# Patient Record
Sex: FEMALE | Race: WHITE | Employment: FULL TIME | URBAN - METROPOLITAN AREA
[De-identification: names, ages, dates, MRNs, and addresses within clinical notes are randomized per-mention and may not be internally consistent; named-entity substitution may affect disease eponyms.]

---

## 2018-04-20 ENCOUNTER — TELEPHONE (OUTPATIENT)
Dept: FAMILY MEDICINE CLINIC | Age: 43
End: 2018-04-20

## 2018-10-19 ENCOUNTER — OFFICE VISIT (OUTPATIENT)
Dept: FAMILY MEDICINE CLINIC | Age: 43
End: 2018-10-19
Payer: COMMERCIAL

## 2018-10-19 VITALS
RESPIRATION RATE: 12 BRPM | SYSTOLIC BLOOD PRESSURE: 122 MMHG | BODY MASS INDEX: 24.13 KG/M2 | HEART RATE: 64 BPM | HEIGHT: 63 IN | WEIGHT: 136.2 LBS | TEMPERATURE: 98.7 F | DIASTOLIC BLOOD PRESSURE: 80 MMHG

## 2018-10-19 DIAGNOSIS — Z00.00 ROUTINE GENERAL MEDICAL EXAMINATION AT A HEALTH CARE FACILITY: ICD-10-CM

## 2018-10-19 DIAGNOSIS — Z00.00 ROUTINE GENERAL MEDICAL EXAMINATION AT A HEALTH CARE FACILITY: Primary | ICD-10-CM

## 2018-10-19 LAB
ALBUMIN SERPL-MCNC: 4.7 G/DL (ref 3.9–4.9)
ALP BLD-CCNC: 45 U/L (ref 40–130)
ALT SERPL-CCNC: 14 U/L (ref 0–33)
ANION GAP SERPL CALCULATED.3IONS-SCNC: 14 MEQ/L (ref 7–13)
AST SERPL-CCNC: 20 U/L (ref 0–35)
BASOPHILS ABSOLUTE: 0 K/UL (ref 0–0.2)
BASOPHILS RELATIVE PERCENT: 0.8 %
BILIRUB SERPL-MCNC: 0.4 MG/DL (ref 0–1.2)
BUN BLDV-MCNC: 12 MG/DL (ref 6–20)
CALCIUM SERPL-MCNC: 9.9 MG/DL (ref 8.6–10.2)
CHLORIDE BLD-SCNC: 101 MEQ/L (ref 98–107)
CHOLESTEROL, TOTAL: 212 MG/DL (ref 0–199)
CO2: 26 MEQ/L (ref 22–29)
CREAT SERPL-MCNC: 0.78 MG/DL (ref 0.5–0.9)
EOSINOPHILS ABSOLUTE: 0.1 K/UL (ref 0–0.7)
EOSINOPHILS RELATIVE PERCENT: 2.9 %
GFR AFRICAN AMERICAN: >60
GFR NON-AFRICAN AMERICAN: >60
GLOBULIN: 2.7 G/DL (ref 2.3–3.5)
GLUCOSE BLD-MCNC: 76 MG/DL (ref 74–109)
HCT VFR BLD CALC: 43.3 % (ref 37–47)
HDLC SERPL-MCNC: 91 MG/DL (ref 40–59)
HEMOGLOBIN: 14.4 G/DL (ref 12–16)
LDL CHOLESTEROL CALCULATED: 109 MG/DL (ref 0–129)
LYMPHOCYTES ABSOLUTE: 1.4 K/UL (ref 1–4.8)
LYMPHOCYTES RELATIVE PERCENT: 33.3 %
MCH RBC QN AUTO: 33 PG (ref 27–31.3)
MCHC RBC AUTO-ENTMCNC: 33.3 % (ref 33–37)
MCV RBC AUTO: 99 FL (ref 82–100)
MONOCYTES ABSOLUTE: 0.4 K/UL (ref 0.2–0.8)
MONOCYTES RELATIVE PERCENT: 9.1 %
NEUTROPHILS ABSOLUTE: 2.3 K/UL (ref 1.4–6.5)
NEUTROPHILS RELATIVE PERCENT: 53.9 %
PDW BLD-RTO: 13.5 % (ref 11.5–14.5)
PLATELET # BLD: 211 K/UL (ref 130–400)
POTASSIUM SERPL-SCNC: 4.2 MEQ/L (ref 3.5–5.1)
RBC # BLD: 4.37 M/UL (ref 4.2–5.4)
SODIUM BLD-SCNC: 141 MEQ/L (ref 132–144)
TOTAL PROTEIN: 7.4 G/DL (ref 6.4–8.1)
TRIGL SERPL-MCNC: 59 MG/DL (ref 0–200)
WBC # BLD: 4.3 K/UL (ref 4.8–10.8)

## 2018-10-19 PROCEDURE — 99396 PREV VISIT EST AGE 40-64: CPT | Performed by: FAMILY MEDICINE

## 2018-10-19 PROCEDURE — G8484 FLU IMMUNIZE NO ADMIN: HCPCS | Performed by: FAMILY MEDICINE

## 2018-10-19 ASSESSMENT — PATIENT HEALTH QUESTIONNAIRE - PHQ9
1. LITTLE INTEREST OR PLEASURE IN DOING THINGS: 0
SUM OF ALL RESPONSES TO PHQ9 QUESTIONS 1 & 2: 0
SUM OF ALL RESPONSES TO PHQ QUESTIONS 1-9: 0
2. FEELING DOWN, DEPRESSED OR HOPELESS: 0
SUM OF ALL RESPONSES TO PHQ QUESTIONS 1-9: 0

## 2018-10-19 ASSESSMENT — ENCOUNTER SYMPTOMS
EYES NEGATIVE: 1
ABDOMINAL PAIN: 0
CONSTIPATION: 0
COUGH: 0
NAUSEA: 0
DIARRHEA: 0
SHORTNESS OF BREATH: 0

## 2019-06-12 ENCOUNTER — TELEPHONE (OUTPATIENT)
Dept: ADMINISTRATIVE | Age: 44
End: 2019-06-12

## 2020-03-27 RX ORDER — ATORVASTATIN CALCIUM 80 MG/1
40 TABLET, FILM COATED ORAL NIGHTLY
COMMUNITY
Start: 2020-03-24 | End: 2020-04-23

## 2020-03-27 RX ORDER — MULTIVITAMIN WITH IRON
250 TABLET ORAL DAILY
COMMUNITY

## 2020-03-31 ENCOUNTER — VIRTUAL VISIT (OUTPATIENT)
Dept: CARDIOLOGY CLINIC | Age: 45
End: 2020-03-31
Payer: COMMERCIAL

## 2020-03-31 PROBLEM — E78.5 DYSLIPIDEMIA: Status: ACTIVE | Noted: 2020-03-31

## 2020-03-31 PROBLEM — Z87.891 HISTORY OF TOBACCO ABUSE: Status: ACTIVE | Noted: 2020-03-31

## 2020-03-31 PROCEDURE — G8421 BMI NOT CALCULATED: HCPCS | Performed by: INTERNAL MEDICINE

## 2020-03-31 PROCEDURE — 99203 OFFICE O/P NEW LOW 30 MIN: CPT | Performed by: INTERNAL MEDICINE

## 2020-03-31 PROCEDURE — G8427 DOCREV CUR MEDS BY ELIG CLIN: HCPCS | Performed by: INTERNAL MEDICINE

## 2020-03-31 PROCEDURE — 1036F TOBACCO NON-USER: CPT | Performed by: INTERNAL MEDICINE

## 2020-03-31 PROCEDURE — G8484 FLU IMMUNIZE NO ADMIN: HCPCS | Performed by: INTERNAL MEDICINE

## 2020-03-31 ASSESSMENT — ENCOUNTER SYMPTOMS
SHORTNESS OF BREATH: 0
VOMITING: 0
WHEEZING: 0
ABDOMINAL PAIN: 0
EYES NEGATIVE: 1
NAUSEA: 0
GASTROINTESTINAL NEGATIVE: 1

## 2020-04-21 ENCOUNTER — VIRTUAL VISIT (OUTPATIENT)
Dept: CARDIOLOGY CLINIC | Age: 45
End: 2020-04-21
Payer: COMMERCIAL

## 2020-04-21 PROCEDURE — G8427 DOCREV CUR MEDS BY ELIG CLIN: HCPCS | Performed by: INTERNAL MEDICINE

## 2020-04-21 PROCEDURE — 99213 OFFICE O/P EST LOW 20 MIN: CPT | Performed by: INTERNAL MEDICINE

## 2020-04-21 PROCEDURE — G8421 BMI NOT CALCULATED: HCPCS | Performed by: INTERNAL MEDICINE

## 2020-04-21 PROCEDURE — 1036F TOBACCO NON-USER: CPT | Performed by: INTERNAL MEDICINE

## 2020-04-21 ASSESSMENT — ENCOUNTER SYMPTOMS
GASTROINTESTINAL NEGATIVE: 1
ABDOMINAL PAIN: 0
WHEEZING: 0
NAUSEA: 0
SHORTNESS OF BREATH: 0
VOMITING: 0
EYES NEGATIVE: 1

## 2020-05-13 ENCOUNTER — HOSPITAL ENCOUNTER (OUTPATIENT)
Dept: CARDIAC CATH/INVASIVE PROCEDURES | Age: 45
Discharge: HOME | End: 2020-05-13
Attending: INTERNAL MEDICINE | Admitting: INTERNAL MEDICINE
Payer: COMMERCIAL

## 2020-05-13 VITALS
OXYGEN SATURATION: 100 % | HEART RATE: 71 BPM | RESPIRATION RATE: 16 BRPM | DIASTOLIC BLOOD PRESSURE: 70 MMHG | WEIGHT: 128 LBS | BODY MASS INDEX: 23.55 KG/M2 | TEMPERATURE: 98.2 F | SYSTOLIC BLOOD PRESSURE: 105 MMHG | HEIGHT: 62 IN

## 2020-05-13 PROCEDURE — 6360000002 HC RX W HCPCS

## 2020-05-13 PROCEDURE — 93325 DOPPLER ECHO COLOR FLOW MAPG: CPT

## 2020-05-13 PROCEDURE — 93321 DOPPLER ECHO F-UP/LMTD STD: CPT

## 2020-05-13 PROCEDURE — 93312 ECHO TRANSESOPHAGEAL: CPT

## 2020-05-13 RX ORDER — ATORVASTATIN CALCIUM 80 MG/1
40 TABLET, FILM COATED ORAL DAILY
COMMUNITY

## 2020-05-13 RX ORDER — SODIUM CHLORIDE 0.9 % (FLUSH) 0.9 %
10 SYRINGE (ML) INJECTION EVERY 12 HOURS SCHEDULED
Status: DISCONTINUED | OUTPATIENT
Start: 2020-05-13 | End: 2020-05-13 | Stop reason: HOSPADM

## 2020-05-13 RX ORDER — SODIUM CHLORIDE 9 MG/ML
INJECTION, SOLUTION INTRAVENOUS CONTINUOUS
Status: DISCONTINUED | OUTPATIENT
Start: 2020-05-13 | End: 2020-05-13 | Stop reason: HOSPADM

## 2020-05-13 RX ORDER — BACTERIOSTATIC SODIUM CHLORIDE 0.9 %
30 VIAL (ML) INJECTION ONCE
Status: DISCONTINUED | OUTPATIENT
Start: 2020-05-13 | End: 2020-05-13 | Stop reason: HOSPADM

## 2020-05-13 RX ORDER — SODIUM CHLORIDE 0.9 % (FLUSH) 0.9 %
10 SYRINGE (ML) INJECTION PRN
Status: DISCONTINUED | OUTPATIENT
Start: 2020-05-13 | End: 2020-05-13 | Stop reason: HOSPADM

## 2020-05-13 RX ORDER — LIDOCAINE HYDROCHLORIDE 20 MG/ML
15 SOLUTION OROPHARYNGEAL ONCE
Status: DISCONTINUED | OUTPATIENT
Start: 2020-05-13 | End: 2020-05-13 | Stop reason: HOSPADM

## 2020-05-13 NOTE — BRIEF OP NOTE
Section of Cardiology  Adult Brief VINCENT Procedure Note        Procedure(s):  VINCENT    Pre-operative Diagnosis:  TIA    H&P Status: Completed and reviewed.      Post-operative Diagnosis:      Normal VINCENT    Findings:  See full report    Complications:  none    Primary Proceduralist:   Dr.Wes Cary DO      Full procedure note to follow

## 2020-05-18 PROCEDURE — 93325 DOPPLER ECHO COLOR FLOW MAPG: CPT | Performed by: INTERNAL MEDICINE

## 2020-05-18 PROCEDURE — 93312 ECHO TRANSESOPHAGEAL: CPT | Performed by: INTERNAL MEDICINE

## 2020-10-07 RX ORDER — ASPIRIN 81 MG/1
81 TABLET ORAL DAILY
COMMUNITY
Start: 2020-03-27

## 2020-10-08 ENCOUNTER — VIRTUAL VISIT (OUTPATIENT)
Dept: CARDIOLOGY CLINIC | Age: 45
End: 2020-10-08
Payer: COMMERCIAL

## 2020-10-08 PROBLEM — Z86.73 HISTORY OF TIA (TRANSIENT ISCHEMIC ATTACK): Status: ACTIVE | Noted: 2020-10-08

## 2020-10-08 PROCEDURE — 1036F TOBACCO NON-USER: CPT | Performed by: INTERNAL MEDICINE

## 2020-10-08 PROCEDURE — 99213 OFFICE O/P EST LOW 20 MIN: CPT | Performed by: INTERNAL MEDICINE

## 2020-10-08 PROCEDURE — G8484 FLU IMMUNIZE NO ADMIN: HCPCS | Performed by: INTERNAL MEDICINE

## 2020-10-08 PROCEDURE — G8420 CALC BMI NORM PARAMETERS: HCPCS | Performed by: INTERNAL MEDICINE

## 2020-10-08 PROCEDURE — G8427 DOCREV CUR MEDS BY ELIG CLIN: HCPCS | Performed by: INTERNAL MEDICINE

## 2020-10-08 ASSESSMENT — ENCOUNTER SYMPTOMS
EYES NEGATIVE: 1
GASTROINTESTINAL NEGATIVE: 1
NAUSEA: 0
WHEEZING: 0
ABDOMINAL PAIN: 0
SHORTNESS OF BREATH: 0
VOMITING: 0

## 2020-10-08 NOTE — PROGRESS NOTES
10/8/2020    TELEHEALTH EVALUATION -- Audio/Visual (During YOVPB-18 public health emergency)    Due to COVID 19 outbreak, patient's office visit was converted to a virtual visit. Patient was contacted and agreed to proceed with a virtual visit via [email or web address] me  The risks and benefits of converting to a virtual visit were discussed in light of the current infectious disease epidemic. Patient also understood that insurance coverage and co-pays are up to their individual insurance plans. HPI:    Kaushal Rebolledo (:  1975) has requested an audio/video evaluation for the following concern(s): CVA    S/p CVA type symptoms and admitted at [facility]. Symptoms lasted for 30min. Was started on ASA and Lipitor. Currently on holter for 14 days. Pt denies chest pain, dyspnea, dyspnea on exertion, change in exercise capacity, fatigue,  nausea, vomiting, diarrhea, constipation, motor weakness, insomnia, weight loss, syncope, dizziness, lightheadedness, palpitations, PND, orthopnea, or claudication. She felt a heat fluttering/palpitation briefly yesterday. No previous cardiac hx. No hx of stress test or LHC. No DM, HTN. + hx of dyslipidemia. No smoking, quit smoking 7 yrs ago.   + family hx of CAD, GM with CABG. S/p CTA of neck: no sig. Stenosis. S/p ECHO    CONCLUSIONS:  - Technically difficult exam due to breast implants. - Exam indication: Stroke  - The left ventricle is normal in size. Left ventricular systolic function is   normal. EF = 67 ± 5% (2D biplane) Normal left ventricular diastolic function.  - The right ventricle is normal in size. Right ventricular systolic function is   normal.  - Estimated right ventricular systolic pressure is 23 mmHg consistent with normal   pulmonary artery pressures. Estimated right atrial pressure is 8 mmHg based on IVC   assessment.  -Saline contrast study with no obvious intra-cardiac shunt, but limited image   quality to exclude a small sunt/PFO.   - The patient has not had a prior CC echocardiographic exam for comparison. 4-21-20: followed with neuro today. S/p 14 day holter monitor which was essentially negative. Very short PSVT. No further symptoms. Pt denies chest pain, dyspnea, dyspnea on exertion, change in exercise capacity, fatigue,  nausea, vomiting, diarrhea, constipation, motor weakness, insomnia, weight loss, syncope, dizziness, lightheadedness, palpitations, PND, orthopnea, or claudication. 10-8-20: s/p normal VINCENT. Followed up with a neuro specialist at [facility] and had a negative 30 day event monitor. Pt denies chest pain, dyspnea, dyspnea on exertion, change in exercise capacity, fatigue,  nausea, vomiting, diarrhea, constipation, motor weakness, insomnia, weight loss, syncope, dizziness, lightheadedness, palpitations, PND, orthopnea, or claudication. She is feeling good overall. Was also seen by [facility] for hypercoagulable workup which was negative. Review of Systems   Constitutional: Negative. Negative for chills and fever. Eyes: Negative. Respiratory: Negative for shortness of breath and wheezing. Cardiovascular: Negative for chest pain, palpitations and leg swelling. Gastrointestinal: Negative. Negative for abdominal pain, nausea and vomiting. Skin: Negative. Negative for rash. Neurological: Negative for dizziness, weakness and headaches. Prior to Visit Medications    Medication Sig Taking?  Authorizing Provider   aspirin (ASPIRIN 81) 81 MG EC tablet Take 81 mg by mouth daily Yes Historical Provider, MD   MAGNESIUM PO Take 250 mg by mouth daily Yes Historical Provider, MD   Misc Natural Products (OSTEO BI-FLEX ADV JOINT SHIELD) TABS Take by mouth Yes Historical Provider, MD   atorvastatin (LIPITOR) 80 MG tablet Take 40 mg by mouth daily  Historical Provider, MD   aspirin 81 MG tablet Take 81 mg by mouth daily  Historical Provider, MD   magnesium (MAGNESIUM-OXIDE) 250 MG TABS tablet Take 250 mg by mouth daily  Historical Provider, MD   [facility] Products (OSTEO BI-FLEX JOINT SHIELD PO) Take 1 tablet by mouth daily   Historical Provider, MD       Social History     Tobacco Use    Smoking status: Former Smoker     Packs/day: 1.50     Years: 13.00     Pack years: 19.50     Types: Cigarettes     Start date: 1999     Last attempt to quit: 2012     Years since quittin.7    Smokeless tobacco: Never Used   Substance Use Topics    Alcohol use: No    Drug use: No        No Known Allergies,   Past Medical History:   Diagnosis Date    Dyslipidemia 3/31/2020    History of TIA (transient ischemic attack) 10/8/2020    History of tobacco abuse 3/31/2020    Irregular periods     TIA (transient ischemic attack)    ,   Past Surgical History:   Procedure Laterality Date    BREAST SURGERY      TONSILLECTOMY     ,   Family History   Problem Relation Age of Onset    Cancer Mother     Hypertension Mother     Early Death Father     Heart Attack Maternal Grandmother     Stroke Maternal Grandfather        PHYSICAL EXAMINATION:  [ INSTRUCTIONS:  \"[x]\" Indicates a positive item  \"[]\" Indicates a negative item  -- DELETE ALL ITEMS NOT EXAMINED]  [x] Alert  [x] Oriented to person/place/time    [x] No apparent distress  [] Toxic appearing    [] Face flushed appearing [x] Sclera clear  [] Lips are cyanotic      [x] Breathing appears normal  [] Appears tachypneic      [] Rash on visible skin    [] Cranial Nerves II-XII grossly intact    [x] Motor grossly intact in visible upper extremities    [] Motor grossly intact in visible lower extremities    [x] Normal Mood  [] Anxious appearing    [] Depressed appearing  [] Confused appearing      [] Poor short term memory  [] Poor long term memory    [] OTHER:      Due to this being a TeleHealth encounter, evaluation of the following organ systems is limited: Vitals/Constitutional/EENT/Resp/CV/GI//MS/Neuro/Skin/Heme-Lymph-Imm. ASSESSMENT:        Diagnosis Orders   1. Dyslipidemia     2. History of tobacco abuse     3. History of TIA (transient ischemic attack)  Diagnostic cardiac cath lab procedure   3. PSVT. PLAN:       Mechele Music from [employer] for self monitoring. Also Consider event monitor or ILR in future if has any further symptoms. Patient to decide. Cont with ASA and lipitor     Coronary evaluation in future. Patient was advised and encouraged to check blood pressure at home or at a pharmacy, maintain a logbook, and also call us back if blood pressure are above the target ranges or if it is low. Patient clearly understands and agrees to the instructions. No follow-ups on file. An  electronic signature was used to authenticate this note. --DO Momo on 10/8/2020 at 8:12 AM        Pursuant to the emergency declaration under the [facility], 1135 waiver authority and the [employer] and [employer] Act, this Virtual  Visit was conducted, with patient's consent, to reduce the patient's risk of exposure to COVID-19 and provide continuity of care for an established patient. Services were provided through a video synchronous discussion virtually to substitute for in-person clinic visit. Total Time spent on virtual visit is 12 min.

## 2020-10-16 ENCOUNTER — TELEPHONE (OUTPATIENT)
Dept: CARDIOLOGY CLINIC | Age: 45
End: 2020-10-16

## 2020-10-16 NOTE — TELEPHONE ENCOUNTER
10/8/20 DR. RAMACHANDRAN ORDER LOOP RECORDER.  PT. WILL CALL TO SCHEDULE.    AS OF TODAY NO RESPONSE FROM PT.

## 2020-10-22 ENCOUNTER — TELEPHONE (OUTPATIENT)
Dept: CARDIOLOGY CLINIC | Age: 45
End: 2020-10-22

## 2021-02-26 ENCOUNTER — TELEPHONE (OUTPATIENT)
Dept: CARDIOLOGY CLINIC | Age: 46
End: 2021-02-26

## 2021-10-12 ENCOUNTER — VIRTUAL VISIT (OUTPATIENT)
Dept: CARDIOLOGY CLINIC | Age: 46
End: 2021-10-12
Payer: COMMERCIAL

## 2021-10-12 DIAGNOSIS — Z87.891 HISTORY OF TOBACCO ABUSE: ICD-10-CM

## 2021-10-12 DIAGNOSIS — Z86.73 HISTORY OF TIA (TRANSIENT ISCHEMIC ATTACK): ICD-10-CM

## 2021-10-12 DIAGNOSIS — E78.5 DYSLIPIDEMIA: Primary | ICD-10-CM

## 2021-10-12 PROCEDURE — G8421 BMI NOT CALCULATED: HCPCS | Performed by: INTERNAL MEDICINE

## 2021-10-12 PROCEDURE — 99213 OFFICE O/P EST LOW 20 MIN: CPT | Performed by: INTERNAL MEDICINE

## 2021-10-12 PROCEDURE — G8484 FLU IMMUNIZE NO ADMIN: HCPCS | Performed by: INTERNAL MEDICINE

## 2021-10-12 PROCEDURE — G8428 CUR MEDS NOT DOCUMENT: HCPCS | Performed by: INTERNAL MEDICINE

## 2021-10-12 PROCEDURE — 4004F PT TOBACCO SCREEN RCVD TLK: CPT | Performed by: INTERNAL MEDICINE

## 2021-10-12 RX ORDER — CLOPIDOGREL BISULFATE 75 MG/1
75 TABLET ORAL DAILY
COMMUNITY
Start: 2021-09-14 | End: 2021-12-13

## 2021-10-12 ASSESSMENT — ENCOUNTER SYMPTOMS
NAUSEA: 0
GASTROINTESTINAL NEGATIVE: 1
ABDOMINAL PAIN: 0
EYES NEGATIVE: 1
WHEEZING: 0
SHORTNESS OF BREATH: 0
VOMITING: 0

## 2021-10-12 NOTE — PROGRESS NOTES
10/12/2021    TELEHEALTH EVALUATION -- Audio/Visual (During LJTSJ-31 public health emergency)    Due to COVID 19 outbreak, patient's office visit was converted to a virtual visit. Patient was contacted and agreed to proceed with a virtual visit via [email or web address] me  The risks and benefits of converting to a virtual visit were discussed in light of the current infectious disease epidemic. Patient also understood that insurance coverage and co-pays are up to their individual insurance plans. HPI:    Breanna Sutton (:  1975) has requested an audio/video evaluation for the following concern(s): CVA    S/p CVA type symptoms and admitted at [facility]. Symptoms lasted for 30min. Was started on ASA and Lipitor. Currently on holter for 14 days. Pt denies chest pain, dyspnea, dyspnea on exertion, change in exercise capacity, fatigue,  nausea, vomiting, diarrhea, constipation, motor weakness, insomnia, weight loss, syncope, dizziness, lightheadedness, palpitations, PND, orthopnea, or claudication. She felt a heat fluttering/palpitation briefly yesterday. No previous cardiac hx. No hx of stress test or LHC. No DM, HTN. + hx of dyslipidemia. No smoking, quit smoking 7 yrs ago.   + family hx of CAD, GM with CABG. S/p CTA of neck: no sig. Stenosis. S/p ECHO    CONCLUSIONS:  - Technically difficult exam due to breast implants. - Exam indication: Stroke  - The left ventricle is normal in size. Left ventricular systolic function is   normal. EF = 67 ± 5% (2D biplane) Normal left ventricular diastolic function.  - The right ventricle is normal in size. Right ventricular systolic function is   normal.  - Estimated right ventricular systolic pressure is 23 mmHg consistent with normal   pulmonary artery pressures. Estimated right atrial pressure is 8 mmHg based on IVC   assessment.  -Saline contrast study with no obvious intra-cardiac shunt, but limited image   quality to exclude a small sunt/PFO.   - The patient has not had a prior CC echocardiographic exam for comparison. 4-21-20: followed with neuro today. S/p 14 day holter monitor which was essentially negative. Very short PSVT. No further symptoms. Pt denies chest pain, dyspnea, dyspnea on exertion, change in exercise capacity, fatigue,  nausea, vomiting, diarrhea, constipation, motor weakness, insomnia, weight loss, syncope, dizziness, lightheadedness, palpitations, PND, orthopnea, or claudication. 10-8-20: s/p normal VINCENT. Followed up with a neuro specialist at [facility] and had a negative 30 day event monitor. Pt denies chest pain, dyspnea, dyspnea on exertion, change in exercise capacity, fatigue,  nausea, vomiting, diarrhea, constipation, motor weakness, insomnia, weight loss, syncope, dizziness, lightheadedness, palpitations, PND, orthopnea, or claudication. She is feeling good overall. Was also seen by [facility] for hypercoagulable workup which was negative. 10-12-21: doing well. Pt denies chest pain, dyspnea, dyspnea on exertion, change in exercise capacity, fatigue,  nausea, vomiting, diarrhea, constipation, motor weakness, insomnia, weight loss, syncope, dizziness, lightheadedness, palpitations, PND, orthopnea, or claudication. BP and hr are good. No LE discoloration or ulcers. No LE edema. No CHF type symptoms. Lipid profile is normal. No recent hospitalization. No change in meds. On Plavix only, no bleeding issues. Review of Systems   Constitutional: Negative. Negative for chills and fever. Eyes: Negative. Respiratory: Negative for shortness of breath and wheezing. Cardiovascular: Negative for chest pain, palpitations and leg swelling. Gastrointestinal: Negative. Negative for abdominal pain, nausea and vomiting. Skin: Negative. Negative for rash. Neurological: Negative for dizziness, weakness and headaches. Prior to Visit Medications    Medication Sig Taking?  Authorizing Provider   clopidogrel (PLAVIX) 75 MG tablet Take 75 mg by mouth daily Yes Historical Provider, MD   aspirin (ASPIRIN 81) 81 MG EC tablet Take 81 mg by mouth daily  Historical Provider, MD   MAGNESIUM PO Take 250 mg by mouth daily  Historical Provider, MD   Misc Natural Products (OSTEO BI-FLEX ADV JOINT SHIELD) TABS Take by mouth  Historical Provider, MD   atorvastatin (LIPITOR) 80 MG tablet Take 40 mg by mouth daily  Historical Provider, MD   aspirin 81 MG tablet Take 81 mg by mouth daily  Historical Provider, MD   magnesium (MAGNESIUM-OXIDE) 250 MG TABS tablet Take 250 mg by mouth daily  Historical Provider, MD   Misc Natural Products (OSTEO BI-FLEX JOINT SHIELD PO) Take 1 tablet by mouth daily   Historical Provider, MD       Social History     Tobacco Use    Smoking status: Former Smoker     Packs/day: 1.50     Years: 13.00     Pack years: 19.50     Types: Cigarettes     Start date: 1999     Quit date: 2012     Years since quittin.7    Smokeless tobacco: Never Used   Vaping Use    Vaping Use: Never used   Substance Use Topics    Alcohol use: No    Drug use: No        No Known Allergies,   Past Medical History:   Diagnosis Date    Dyslipidemia 3/31/2020    History of TIA (transient ischemic attack) 10/8/2020    History of tobacco abuse 3/31/2020    Irregular periods     TIA (transient ischemic attack)    ,   Past Surgical History:   Procedure Laterality Date    BREAST SURGERY      TONSILLECTOMY     ,   Family History   Problem Relation Age of Onset    Cancer Mother     Hypertension Mother     Early Death Father     Heart Attack Maternal Grandmother     Stroke Maternal Grandfather        PHYSICAL EXAMINATION:  [ INSTRUCTIONS:  \"[x]\" Indicates a positive item  \"[]\" Indicates a negative item  -- DELETE ALL ITEMS NOT EXAMINED]  [x] Alert  [x] Oriented to person/place/time    [x] No apparent distress  [] Toxic appearing    [] Face flushed appearing [x] Sclera clear  [] Lips are cyanotic      [x] Breathing appears normal  [] Appears tachypneic      [] Rash on visible skin    [] Cranial Nerves II-XII grossly intact    [x] Motor grossly intact in visible upper extremities    [] Motor grossly intact in visible lower extremities    [x] Normal Mood  [] Anxious appearing    [] Depressed appearing  [] Confused appearing      [] Poor short term memory  [] Poor long term memory    [] OTHER:      Due to this being a TeleHealth encounter, evaluation of the following organ systems is limited: Vitals/Constitutional/EENT/Resp/CV/GI//MS/Neuro/Skin/Heme-Lymph-Imm. ASSESSMENT:        Diagnosis Orders   1. Dyslipidemia     2. History of TIA (transient ischemic attack)     3. History of tobacco abuse     3. PSVT. PLAN:       Leti Oliver from [employer] for self monitoring. Also Consider event monitor or ILR in future if has any further symptoms. Patient to decide. Cont with ASA and lipitor     Coronary evaluation in future. Patient was advised and encouraged to check blood pressure at home or at a pharmacy, maintain a logbook, and also call us back if blood pressure are above the target ranges or if it is low. Patient clearly understands and agrees to the instructions. No follow-ups on file. An  electronic signature was used to authenticate this note. --DO Momo on 10/12/2021 at 8:45 AM        Pursuant to the emergency declaration under the [facility], 1135 waiver authority and the [employer] and [employer] Act, this Virtual  Visit was conducted, with patient's consent, to reduce the patient's risk of exposure to COVID-19 and provide continuity of care for an established patient. Services were provided through a video synchronous discussion virtually to substitute for in-person clinic visit. Total Time spent on virtual visit is 13 min.

## 2023-04-05 DIAGNOSIS — F98.8 ATTENTION DEFICIT DISORDER, UNSPECIFIED HYPERACTIVITY PRESENCE: ICD-10-CM

## 2023-04-05 RX ORDER — METHYLPHENIDATE HYDROCHLORIDE 27 MG/1
27 TABLET ORAL EVERY MORNING
Qty: 30 TABLET | Refills: 0 | Status: SHIPPED | OUTPATIENT
Start: 2023-04-05 | End: 2023-05-26 | Stop reason: ALTCHOICE

## 2023-04-05 RX ORDER — METHYLPHENIDATE HYDROCHLORIDE 27 MG/1
27 TABLET ORAL EVERY MORNING
COMMUNITY
Start: 2023-01-24 | End: 2023-04-05 | Stop reason: SDUPTHER

## 2023-04-05 NOTE — TELEPHONE ENCOUNTER
Rx Refill Request Telephone Encounter    Name:  Erica KincaidClaraGawlik  : 1975     Medication Name:  Methylphenidate  Dose (Optional):    30 MG  Quantity (Optional):    30  Directions (Optional):   Take 1 tablet daily in the morning    ALLERGIES:   knda    Specific Pharmacy location:  [facility]    Date of last appointment:  23

## 2023-04-07 ENCOUNTER — TELEPHONE (OUTPATIENT)
Dept: PRIMARY CARE | Facility: CLINIC | Age: 48
End: 2023-04-07
Payer: COMMERCIAL

## 2023-04-10 RX ORDER — METHYLPHENIDATE HYDROCHLORIDE 36 MG/1
36 TABLET ORAL
COMMUNITY
Start: 2023-02-27 | End: 2023-04-10 | Stop reason: SDUPTHER

## 2023-04-10 RX ORDER — METHYLPHENIDATE HYDROCHLORIDE 36 MG/1
36 TABLET ORAL
Qty: 30 TABLET | Refills: 0 | Status: SHIPPED | OUTPATIENT
Start: 2023-04-10 | End: 2023-05-26 | Stop reason: SDUPTHER

## 2023-04-10 NOTE — TELEPHONE ENCOUNTER
Pt is calling in regards to the rx for Methylphenidate that you had sent in last week. It was sent in for the wrong mg. It was supposed to be 36 mg.   Please send the Methylphenidate 36 mg to [facility] [place]  Wanted to know if another provider could send this in since Dr Gary is out of the office today  Thanks

## 2023-05-08 DIAGNOSIS — E78.5 HYPERLIPIDEMIA, UNSPECIFIED HYPERLIPIDEMIA TYPE: ICD-10-CM

## 2023-05-08 RX ORDER — ATORVASTATIN CALCIUM 20 MG/1
1 TABLET, FILM COATED ORAL DAILY
COMMUNITY
Start: 2020-03-27 | End: 2023-05-08 | Stop reason: SDUPTHER

## 2023-05-08 RX ORDER — ATORVASTATIN CALCIUM 20 MG/1
20 TABLET, FILM COATED ORAL DAILY
Qty: 90 TABLET | Refills: 0 | Status: SHIPPED | OUTPATIENT
Start: 2023-05-08 | End: 2023-08-24 | Stop reason: SDUPTHER

## 2023-05-23 DIAGNOSIS — F98.8 ATTENTION DEFICIT DISORDER, UNSPECIFIED HYPERACTIVITY PRESENCE: ICD-10-CM

## 2023-05-23 NOTE — TELEPHONE ENCOUNTER
Okay.  Please arrange for that dose and make sure it is arranged for d.a.w.  Thanks       Patient has multiple doses of Concerta on his list.  Please confirm what dose he needs refilled.

## 2023-05-23 NOTE — TELEPHONE ENCOUNTER
Patient calling and requesting the BRAND name Concerta.     Pharmacy:  [facility]

## 2023-05-26 RX ORDER — METHYLPHENIDATE HYDROCHLORIDE 36 MG/1
36 TABLET ORAL
Qty: 30 TABLET | Refills: 0 | Status: SHIPPED | OUTPATIENT
Start: 2023-05-26 | End: 2023-06-30 | Stop reason: SDUPTHER

## 2023-06-30 DIAGNOSIS — F98.8 ATTENTION DEFICIT DISORDER, UNSPECIFIED HYPERACTIVITY PRESENCE: ICD-10-CM

## 2023-06-30 RX ORDER — METHYLPHENIDATE HYDROCHLORIDE 36 MG/1
36 TABLET ORAL
Qty: 30 TABLET | Refills: 0 | Status: SHIPPED | OUTPATIENT
Start: 2023-06-30 | End: 2023-08-01 | Stop reason: SDUPTHER

## 2023-06-30 NOTE — TELEPHONE ENCOUNTER
PATIENT IS CALLING FOR REFILLS ON:    CONCERTA (MUST BE NAME BRAND)  36 MG - 1 TABLET DAILY    [facility] [place]    LAST OV: 2/27/23    NEXT OV: 8/25/23    [phone]    PATIENT AWARE THAT THIS MAY NOT BE ADDRESSED UNTIL MONDAY.

## 2023-08-01 DIAGNOSIS — F98.8 ATTENTION DEFICIT DISORDER, UNSPECIFIED HYPERACTIVITY PRESENCE: ICD-10-CM

## 2023-08-01 RX ORDER — METHYLPHENIDATE HYDROCHLORIDE 36 MG/1
36 TABLET ORAL
Qty: 30 TABLET | Refills: 0 | Status: SHIPPED | OUTPATIENT
Start: 2023-08-01 | End: 2023-08-24 | Stop reason: SDUPTHER

## 2023-08-01 NOTE — TELEPHONE ENCOUNTER
PATIENT IS REQUESTING THE FOLLOWING MEDICATIONS:  Patient is requesting  MARTÍN-- SHE STATED BECAUSE THE GENERIC DOES NOT MAKE HER FEEL THE SAME       NAME:CONCERTA  DOSE:36 MG  DIRECTIONS:TAKE 1 TABLET BY MOUTH ONCE DAILY IN THE MORNINGS TAKE BEFORE MEALS  UDS- CSA ARE NEEDED AT APPOINTMENT SCHEDULED FOR 8/20  LOV:2/2023  Nov 8/20/23  [id number] [address] CONTACT NUMBER FOR PATIENT  
No

## 2023-08-23 ENCOUNTER — TELEPHONE (OUTPATIENT)
Dept: PRIMARY CARE | Facility: CLINIC | Age: 48
End: 2023-08-23

## 2023-08-23 ENCOUNTER — CLINICAL SUPPORT (OUTPATIENT)
Dept: PRIMARY CARE | Facility: CLINIC | Age: 48
End: 2023-08-23
Payer: COMMERCIAL

## 2023-08-23 ENCOUNTER — APPOINTMENT (OUTPATIENT)
Dept: PRIMARY CARE | Facility: CLINIC | Age: 48
End: 2023-08-23
Payer: COMMERCIAL

## 2023-08-23 DIAGNOSIS — F98.8 ATTENTION DEFICIT DISORDER, UNSPECIFIED HYPERACTIVITY PRESENCE: ICD-10-CM

## 2023-08-23 DIAGNOSIS — E78.5 HYPERLIPIDEMIA, UNSPECIFIED HYPERLIPIDEMIA TYPE: ICD-10-CM

## 2023-08-23 PROCEDURE — 80373 DRUG SCREENING TRAMADOL: CPT

## 2023-08-23 PROCEDURE — 80368 SEDATIVE HYPNOTICS: CPT

## 2023-08-23 PROCEDURE — 80346 BENZODIAZEPINES1-12: CPT

## 2023-08-23 PROCEDURE — 80365 DRUG SCREENING OXYCODONE: CPT

## 2023-08-23 PROCEDURE — 80324 DRUG SCREEN AMPHETAMINES 1/2: CPT

## 2023-08-23 PROCEDURE — 80307 DRUG TEST PRSMV CHEM ANLYZR: CPT

## 2023-08-23 PROCEDURE — 80358 DRUG SCREENING METHADONE: CPT

## 2023-08-23 PROCEDURE — 80354 DRUG SCREENING FENTANYL: CPT

## 2023-08-23 PROCEDURE — 80361 OPIATES 1 OR MORE: CPT

## 2023-08-23 NOTE — TELEPHONE ENCOUNTER
PATIENT WAS SCHEDULED WITH DR JIMENEZ TODAY, SHE IS COMING IN STILL FOR HER CONTRACT AND UDS. SHE IS RESCHEDULED TIL 2023 ([facility] NEXT AVAILABLE)      Rx Refill Request Telephone Encounter    Name: Erica BachGaswetha  :  1975    Medication Name:   CONCERTA (BRAND NAME)  Dose (Optional):   30 MG  Quantity (Optional):   30  Directions (Optional):   TAKE 1 DAILY    Medication Name:   ATORVASTATIN  Dose (Optional):   20 MG  Quantity (Optional):   30   Directions (Optional):   TAKE 1 DAILY    ALLERGIES:    NKDA    LAST DRUG SCREEN:     COMING IN TODAY AT 2:30  LAST MED CONTRACT:    COMING IN TODAY AT 2:30    Specific Pharmacy location:    [facility]     Date of last appointment:    2023  Date of next appointment:    2023    Best number to reach patient:    [phone]

## 2023-08-23 NOTE — LETTER
August 23, 2023     Patient: Erica Smith   YOB: 1975   Date of Visit: 8/23/2023       To Whom It May Concern:    Erica Smith was seen in my clinic on 8/23/2023 for an appointment with our medical assistants.    If you have any questions or concerns, please don't hesitate to call.     Sincerely,         Jesus Gary MD

## 2023-08-24 RX ORDER — METHYLPHENIDATE HYDROCHLORIDE 36 MG/1
36 TABLET ORAL
Qty: 30 TABLET | Refills: 0 | Status: SHIPPED | OUTPATIENT
Start: 2023-08-24 | End: 2023-09-13 | Stop reason: DRUGHIGH

## 2023-08-24 RX ORDER — ATORVASTATIN CALCIUM 20 MG/1
20 TABLET, FILM COATED ORAL DAILY
Qty: 90 TABLET | Refills: 0 | Status: SHIPPED | OUTPATIENT
Start: 2023-08-24 | End: 2024-04-01

## 2023-08-25 ENCOUNTER — APPOINTMENT (OUTPATIENT)
Dept: PRIMARY CARE | Facility: CLINIC | Age: 48
End: 2023-08-25
Payer: COMMERCIAL

## 2023-08-28 LAB
AMPHETAMINES,URINE: <50 NG/ML
MDA,URINE: <200 NG/ML
MDEA,URINE: <200 NG/ML
MDMA,UR: <200 NG/ML
METHAMPHETAMINE QUANTITATIVE URINE: <200 NG/ML
PHENTERMINE,UR: <200 NG/ML

## 2023-08-29 ENCOUNTER — TELEPHONE (OUTPATIENT)
Dept: PRIMARY CARE | Facility: CLINIC | Age: 48
End: 2023-08-29
Payer: COMMERCIAL

## 2023-08-29 DIAGNOSIS — F98.8 ATTENTION DEFICIT DISORDER, UNSPECIFIED HYPERACTIVITY PRESENCE: ICD-10-CM

## 2023-08-29 LAB
6-ACETYLMORPHINE: <25 NG/ML
7-AMINOCLONAZEPAM: <25 NG/ML
ALPHA-HYDROXYALPRAZOLAM: <25 NG/ML
ALPHA-HYDROXYMIDAZOLAM: <25 NG/ML
ALPRAZOLAM: <25 NG/ML
AMPHETAMINE (PRESENCE) IN URINE BY SCREEN METHOD: NORMAL
BARBITURATES PRESENCE IN URINE BY SCREEN METHOD: NORMAL
CANNABINOIDS IN URINE BY SCREEN METHOD: NORMAL
CHLORDIAZEPOXIDE: <25 NG/ML
CLONAZEPAM: <25 NG/ML
COCAINE (PRESENCE) IN URINE BY SCREEN METHOD: NORMAL
CODEINE: <50 NG/ML
CREATINE, URINE FOR DRUG: 94.4 MG/DL
DIAZEPAM: <25 NG/ML
DRUG SCREEN COMMENT URINE: NORMAL
EDDP: <25 NG/ML
FENTANYL CONFIRMATION, URINE: <2.5 NG/ML
HYDROCODONE: <25 NG/ML
HYDROMORPHONE: <25 NG/ML
LORAZEPAM: <25 NG/ML
METHADONE CONFIRMATION,URINE: <25 NG/ML
MIDAZOLAM: <25 NG/ML
MORPHINE URINE: <50 NG/ML
NORDIAZEPAM: <25 NG/ML
NORFENTANYL: <2.5 NG/ML
NORHYDROCODONE: <25 NG/ML
NOROXYCODONE: <25 NG/ML
O-DESMETHYLTRAMADOL: <50 NG/ML
OXAZEPAM: <25 NG/ML
OXYCODONE: <25 NG/ML
OXYMORPHONE: <25 NG/ML
PHENCYCLIDINE (PRESENCE) IN URINE BY SCREEN METHOD: NORMAL
TEMAZEPAM: <25 NG/ML
TRAMADOL: <50 NG/ML
ZOLPIDEM METABOLITE (ZCA): <25 NG/ML
ZOLPIDEM: <25 NG/ML

## 2023-08-29 RX ORDER — METHYLPHENIDATE HYDROCHLORIDE 36 MG/1
36 TABLET ORAL EVERY MORNING
Qty: 30 TABLET | Refills: 0 | Status: SHIPPED | OUTPATIENT
Start: 2023-09-28 | End: 2023-09-20 | Stop reason: ALTCHOICE

## 2023-08-29 NOTE — TELEPHONE ENCOUNTER
Jesus Gary MD  [address] [place] [id number] Clinical Support Staff1 minute ago (5:16 PM)       I assume she is talking about the Concerta.  Please clarify and arrange for the prescription d.a.w.  Please let the patient know it can sometimes be difficult to get it that way as it is very expensive.  Thanks     PT UNDERSTANDS. STILL WANTS MEDICATION MARTÍN

## 2023-08-29 NOTE — TELEPHONE ENCOUNTER
"PATIENT NEEDS THE PRESCRIPTION  \"MARTÍN\" FOR THE BRAND NAME.   PRESCRIPTION WAS SENT BUT WHILE YOU WERE OUT WITH COVID IT WAS SENT MARTÍN FOR THE GENERIC.  PHARMACY SAID IT MIGHT NEED A PA FOR THE NAME BRAND BUT THEY WILL NEED A NEW SCRIPT TO BE ABLE TO RUN IT.  SHE CANNOT PICK IT UP TILL SEPT 4TH   PLEASE ADVISE   "

## 2023-09-01 DIAGNOSIS — F98.8 ATTENTION DEFICIT DISORDER, UNSPECIFIED HYPERACTIVITY PRESENCE: ICD-10-CM

## 2023-09-01 RX ORDER — METHYLPHENIDATE HYDROCHLORIDE 36 MG/1
36 TABLET, EXTENDED RELEASE ORAL EVERY MORNING
Qty: 30 TABLET | Refills: 0 | Status: SHIPPED | OUTPATIENT
Start: 2023-09-01 | End: 2023-09-20 | Stop reason: ALTCHOICE

## 2023-09-13 ENCOUNTER — OFFICE VISIT (OUTPATIENT)
Dept: PRIMARY CARE | Facility: CLINIC | Age: 48
End: 2023-09-13
Payer: COMMERCIAL

## 2023-09-13 VITALS
SYSTOLIC BLOOD PRESSURE: 120 MMHG | HEART RATE: 66 BPM | HEIGHT: 62 IN | RESPIRATION RATE: 12 BRPM | BODY MASS INDEX: 24.37 KG/M2 | OXYGEN SATURATION: 99 % | DIASTOLIC BLOOD PRESSURE: 70 MMHG | WEIGHT: 132.4 LBS | TEMPERATURE: 99.1 F

## 2023-09-13 DIAGNOSIS — Z23 NEED FOR INFLUENZA VACCINATION: ICD-10-CM

## 2023-09-13 DIAGNOSIS — Z12.31 VISIT FOR SCREENING MAMMOGRAM: ICD-10-CM

## 2023-09-13 DIAGNOSIS — Z12.11 SPECIAL SCREENING FOR MALIGNANT NEOPLASM OF COLON: ICD-10-CM

## 2023-09-13 DIAGNOSIS — K43.9 ABDOMINAL WALL HERNIA: ICD-10-CM

## 2023-09-13 DIAGNOSIS — F98.8 ATTENTION DEFICIT DISORDER, UNSPECIFIED HYPERACTIVITY PRESENCE: Primary | ICD-10-CM

## 2023-09-13 PROCEDURE — 99213 OFFICE O/P EST LOW 20 MIN: CPT | Performed by: FAMILY MEDICINE

## 2023-09-13 PROCEDURE — 90686 IIV4 VACC NO PRSV 0.5 ML IM: CPT | Performed by: FAMILY MEDICINE

## 2023-09-13 PROCEDURE — 90471 IMMUNIZATION ADMIN: CPT | Performed by: FAMILY MEDICINE

## 2023-09-13 PROCEDURE — 1036F TOBACCO NON-USER: CPT | Performed by: FAMILY MEDICINE

## 2023-09-13 RX ORDER — METHYLPHENIDATE HYDROCHLORIDE 54 MG/1
54 TABLET ORAL EVERY MORNING
Qty: 30 TABLET | Refills: 0 | Status: SHIPPED | OUTPATIENT
Start: 2023-09-13 | End: 2023-09-20 | Stop reason: SDUPTHER

## 2023-09-13 ASSESSMENT — PATIENT HEALTH QUESTIONNAIRE - PHQ9
SUM OF ALL RESPONSES TO PHQ9 QUESTIONS 1 AND 2: 0
2. FEELING DOWN, DEPRESSED OR HOPELESS: NOT AT ALL
1. LITTLE INTEREST OR PLEASURE IN DOING THINGS: NOT AT ALL

## 2023-09-13 NOTE — PROGRESS NOTES
"Subjective   Patient ID: Erica Smith is a 48 y.o. female who presents for ADD and Abdominal Lump.  HPI  Patient presents today for a follow-up on ADD. Is taking Concerta 36 MG. States she has not really noticed any difference with this. Would like to discus increasing it.  Rates the ADD a 8-9/10 over the past 7 days. Reports that the medication gives 60% ADD control/relief. [place]AR[place] reviewed today. Controlled substance contract signed on 8-23-23. UDS 8-23-23.    Has mentioned to us before pain in her abdomen. When bending over there is a lump that \"protrudes\". It causes a lot of pain. Doesn't think it has changed in size. It's always in the same place.      Taking current medications which were reviewed.  Problem list discussed.    Overall doing well.  Eating okay.  Staying active.    Has no other new problem /question.      ROS  Constitutional- No activity change. No appetite change.  Eyes- Denies vision changes.  Respiratory- No shortness of breath.  Cardiovascular- No palpitations. No chest pain.  GI- No nausea or vomiting. No diarrhea or constipation. Denies abdominal pain.  Musculoskeletal- Denies joint swelling.  Extremities- No edema.  Neurological- Denies headaches. Denies dizziness.  Skin- No rashes.  Psychiatric/Behavioral- Denies significant anxiety, or depressed mood.     Objective     /70   Pulse 66   Temp 37.3 °C (99.1 °F)   Resp 12   Ht 1.575 m (5' 2\")   Wt 60.1 kg (132 lb 6.4 oz)   LMP  (LMP Unknown)   SpO2 99%   BMI 24.22 kg/m²     Allergies   Allergen Reactions    Hamster Protein Shortness of breath     Wheezing and SOB from Hamster       Constitutional-- Well-nourished.  No distress  Head- unremarkable.  Eyes- PERRL.  Conjunctiva normal.  Nose- Normal.  No rhinorrhea noted.  Throat- Oropharynx is clear and moist.  Neck- Supple with no thyromegaly.  No significant cervical adenopathy noted.  Pulmonary/Chest- Breath sounds normal with normal effort.  No wheezing.  Heart- " Regular rate and rhythm.  No murmur.  Abdomen- Soft and non-tender.  No masses noted.  Questionable near midline left extreme upper abdominal wall hernia.  Musculoskeletal- Normal ROM.  No significant joint swelling  Extremities- No edema.   Neurological- Alert.  No noted deficits.  Skin- Warm.  No rashes.  Psychiatric/Behavioral- Mood and affect normal.  Behavior normal.     Assessment/Plan   1. Attention deficit disorder, unspecified hyperactivity presence  methylphenidate (Concerta) 54 mg ER tablet      2. Visit for screening mammogram  BI mammo bilateral screening tomosynthesis      3. Special screening for malignant neoplasm of colon  Colonoscopy Screening      4. Need for influenza vaccination  Flu vaccine (IIV4) age 6 months and greater, preservative free      5. Abdominal wall hernia  Referral to General Surgery             Long talk. Treatment options reviewed.    I have reviewed and validated OARRS. I advised the patient about medication abuse, addiction, and dependency. I have counseled the patient on ADHD management.     Educated on hernias and hernia care.     Advised patient to remain up to date on routine screening and maintenance.     Advised patient to remain up to date on immunizations.      Continue and take your medications as prescribed.    Health Maintenance issues discussed.    Importance of healthy diet and regular exercise regimen discussed.    We will contact you with any test results ordered. If you do not hear from us, please contact.    Follow-up as instructed or sooner if any problems or symptoms do not resolve as expected.    Scribe Attestation  By signing my name below, Tari MCGUIRE Scribe   attest that this documentation has been prepared under the direction and in the presence of Jesus Gary MD.

## 2023-09-20 DIAGNOSIS — F98.8 ATTENTION DEFICIT DISORDER, UNSPECIFIED HYPERACTIVITY PRESENCE: ICD-10-CM

## 2023-09-20 RX ORDER — METHYLPHENIDATE HYDROCHLORIDE 54 MG/1
54 TABLET ORAL EVERY MORNING
Qty: 30 TABLET | Refills: 0 | Status: SHIPPED | OUTPATIENT
Start: 2023-09-20 | End: 2023-10-19 | Stop reason: SDUPTHER

## 2023-09-20 NOTE — TELEPHONE ENCOUNTER
PATIENT IS CALLING REQUESTING CONCERTA 54 MG [facility] IN [facility] IS OUT OF THIS MEDICATION

## 2023-10-13 PROBLEM — I49.9 SUPRAVENTRICULAR ARRHYTHMIA: Status: RESOLVED | Noted: 2020-07-27 | Resolved: 2023-10-13

## 2023-10-13 PROBLEM — R53.83 FATIGUE: Status: ACTIVE | Noted: 2023-10-13

## 2023-10-13 PROBLEM — G45.9 TIA (TRANSIENT ISCHEMIC ATTACK): Status: ACTIVE | Noted: 2020-03-23

## 2023-10-13 PROBLEM — N92.6 IRREGULAR PERIODS: Status: ACTIVE | Noted: 2023-10-13

## 2023-10-13 PROBLEM — E78.5 DYSLIPIDEMIA: Status: ACTIVE | Noted: 2020-03-31

## 2023-10-13 PROBLEM — I63.9 ISCHEMIC STROKE (MULTI): Status: ACTIVE | Noted: 2020-04-21

## 2023-10-13 PROBLEM — R23.2 HOT FLASHES: Status: ACTIVE | Noted: 2023-10-13

## 2023-10-13 PROBLEM — R41.840 DIFFICULTY CONCENTRATING: Status: ACTIVE | Noted: 2023-10-13

## 2023-10-13 PROBLEM — N95.1 MENOPAUSAL STATE: Status: ACTIVE | Noted: 2023-10-13

## 2023-10-13 PROBLEM — R20.0 LEFT LEG NUMBNESS: Status: ACTIVE | Noted: 2023-10-13

## 2023-10-13 PROBLEM — R20.2 FACIAL PARESTHESIA: Status: ACTIVE | Noted: 2020-07-27

## 2023-10-13 PROBLEM — I63.30 CEREBROVASCULAR ACCIDENT (CVA) DUE TO THROMBOSIS OF CEREBRAL ARTERY (MULTI): Status: ACTIVE | Noted: 2023-10-13

## 2023-10-13 PROBLEM — E78.00 PURE HYPERCHOLESTEROLEMIA: Status: ACTIVE | Noted: 2020-07-27

## 2023-10-13 PROBLEM — R10.9 ABDOMINAL SPASMS: Status: ACTIVE | Noted: 2023-10-13

## 2023-10-13 RX ORDER — ASPIRIN 81 MG/1
81 TABLET ORAL DAILY
COMMUNITY

## 2023-10-13 RX ORDER — MAGNESIUM 250 MG
TABLET ORAL
COMMUNITY

## 2023-10-16 ENCOUNTER — ANESTHESIA EVENT (OUTPATIENT)
Dept: GASTROENTEROLOGY | Facility: HOSPITAL | Age: 48
End: 2023-10-16
Payer: COMMERCIAL

## 2023-10-16 ENCOUNTER — ANESTHESIA (OUTPATIENT)
Dept: GASTROENTEROLOGY | Facility: HOSPITAL | Age: 48
End: 2023-10-16
Payer: COMMERCIAL

## 2023-10-16 ENCOUNTER — HOSPITAL ENCOUNTER (OUTPATIENT)
Dept: GASTROENTEROLOGY | Facility: HOSPITAL | Age: 48
Setting detail: OUTPATIENT SURGERY
Discharge: HOME | End: 2023-10-16
Payer: COMMERCIAL

## 2023-10-16 VITALS
BODY MASS INDEX: 23.22 KG/M2 | RESPIRATION RATE: 16 BRPM | HEART RATE: 82 BPM | DIASTOLIC BLOOD PRESSURE: 73 MMHG | HEIGHT: 61 IN | OXYGEN SATURATION: 98 % | WEIGHT: 123 LBS | SYSTOLIC BLOOD PRESSURE: 110 MMHG | TEMPERATURE: 98.2 F

## 2023-10-16 DIAGNOSIS — Z12.11 SPECIAL SCREENING FOR MALIGNANT NEOPLASM OF COLON: ICD-10-CM

## 2023-10-16 PROCEDURE — 45378 DIAGNOSTIC COLONOSCOPY: CPT | Performed by: INTERNAL MEDICINE

## 2023-10-16 PROCEDURE — A45378 PR COLONOSCOPY,DIAGNOSTIC: Performed by: NURSE ANESTHETIST, CERTIFIED REGISTERED

## 2023-10-16 PROCEDURE — 3700000001 HC GENERAL ANESTHESIA TIME - INITIAL BASE CHARGE

## 2023-10-16 PROCEDURE — 2580000001 HC RX 258 IV SOLUTIONS: Performed by: NURSE ANESTHETIST, CERTIFIED REGISTERED

## 2023-10-16 PROCEDURE — 7100000009 HC PHASE TWO TIME - INITIAL BASE CHARGE

## 2023-10-16 PROCEDURE — 7100000010 HC PHASE TWO TIME - EACH INCREMENTAL 1 MINUTE

## 2023-10-16 PROCEDURE — 3700000002 HC GENERAL ANESTHESIA TIME - EACH INCREMENTAL 1 MINUTE

## 2023-10-16 PROCEDURE — G0105 COLORECTAL SCRN; HI RISK IND: HCPCS | Performed by: INTERNAL MEDICINE

## 2023-10-16 PROCEDURE — A45378 PR COLONOSCOPY,DIAGNOSTIC: Performed by: ANESTHESIOLOGY

## 2023-10-16 PROCEDURE — 2500000004 HC RX 250 GENERAL PHARMACY W/ HCPCS (ALT 636 FOR OP/ED): Performed by: NURSE ANESTHETIST, CERTIFIED REGISTERED

## 2023-10-16 RX ORDER — PROPOFOL 10 MG/ML
INJECTION, EMULSION INTRAVENOUS CONTINUOUS PRN
Status: DISCONTINUED | OUTPATIENT
Start: 2023-10-16 | End: 2023-10-16

## 2023-10-16 RX ORDER — PROPOFOL 10 MG/ML
INJECTION, EMULSION INTRAVENOUS AS NEEDED
Status: DISCONTINUED | OUTPATIENT
Start: 2023-10-16 | End: 2023-10-16

## 2023-10-16 RX ORDER — SODIUM CHLORIDE 9 MG/ML
20 INJECTION, SOLUTION INTRAVENOUS CONTINUOUS
OUTPATIENT
Start: 2023-10-16

## 2023-10-16 RX ADMIN — PROPOFOL 20 MG: 10 INJECTION, EMULSION INTRAVENOUS at 14:15

## 2023-10-16 RX ADMIN — PROPOFOL 60 MG: 10 INJECTION, EMULSION INTRAVENOUS at 14:11

## 2023-10-16 RX ADMIN — PROPOFOL 150 MCG/KG/MIN: 10 INJECTION, EMULSION INTRAVENOUS at 14:11

## 2023-10-16 RX ADMIN — PROPOFOL 10 MG: 10 INJECTION, EMULSION INTRAVENOUS at 14:19

## 2023-10-16 RX ADMIN — SODIUM CHLORIDE, SODIUM LACTATE, POTASSIUM CHLORIDE, AND CALCIUM CHLORIDE: 600; 310; 30; 20 INJECTION, SOLUTION INTRAVENOUS at 14:00

## 2023-10-16 RX ADMIN — PROPOFOL 20 MG: 10 INJECTION, EMULSION INTRAVENOUS at 14:13

## 2023-10-16 SDOH — HEALTH STABILITY: MENTAL HEALTH: CURRENT SMOKER: 0

## 2023-10-16 ASSESSMENT — COLUMBIA-SUICIDE SEVERITY RATING SCALE - C-SSRS
2. HAVE YOU ACTUALLY HAD ANY THOUGHTS OF KILLING YOURSELF?: NO
6. HAVE YOU EVER DONE ANYTHING, STARTED TO DO ANYTHING, OR PREPARED TO DO ANYTHING TO END YOUR LIFE?: NO
1. IN THE PAST MONTH, HAVE YOU WISHED YOU WERE DEAD OR WISHED YOU COULD GO TO SLEEP AND NOT WAKE UP?: NO

## 2023-10-16 ASSESSMENT — PAIN - FUNCTIONAL ASSESSMENT
PAIN_FUNCTIONAL_ASSESSMENT: 0-10

## 2023-10-16 ASSESSMENT — PAIN SCALES - GENERAL
PAINLEVEL_OUTOF10: 0 - NO PAIN
PAIN_LEVEL: 2
PAINLEVEL_OUTOF10: 0 - NO PAIN

## 2023-10-16 NOTE — ANESTHESIA PREPROCEDURE EVALUATION
Patient: Erica Smith    Procedure Information       Date/Time: 10/16/23 1240    Scheduled providers: Kaylin Quiñonez MD; Layo Koehler RN    Procedure: COLONOSCOPY    Location: [facility]            Relevant Problems   Cardiovascular   (+) Pure hypercholesterolemia      Neuro/Psych   (+) Cerebrovascular accident (CVA) due to thrombosis of cerebral artery (CMS/HCC)   (+) Ischemic stroke (CMS/HCC)       Clinical information reviewed:   Tobacco  Allergies  Meds   Med Hx  Surg Hx  OB Status  Fam Hx  Soc   Hx        NPO Detail:  NPO/Void Status  Date of Last Liquid: 10/16/23  Time of Last Liquid: 0830  Date of Last Solid: 10/15/23  Time of Last Solid: 1000  Last Intake Type: Clear fluids  Time of Last Void: 1227         Physical Exam    Airway  Mallampati: II  TM distance: >3 FB  Neck ROM: full     Cardiovascular   Rhythm: regular  Rate: normal     Dental - normal exam     Pulmonary   Breath sounds clear to auscultation     Abdominal            Anesthesia Plan    ASA 3     MAC     The patient is not a current smoker.    intravenous induction   Anesthetic plan and risks discussed with patient.    Plan discussed with CAA.

## 2023-10-16 NOTE — ANESTHESIA POSTPROCEDURE EVALUATION
Patient: Erica Smith    Procedure Summary       Date: 10/16/23 Room / Location: [facility]    Anesthesia Start: 1400 Anesthesia Stop:     Procedure: COLONOSCOPY Diagnosis: Special screening for malignant neoplasm of colon    Scheduled Providers: Kaylin Quiñonez MD; Layo Koehler RN Responsible Provider: Luis Owen MD    Anesthesia Type: MAC ASA Status: 3            Anesthesia Type: MAC    Vitals Value Taken Time   /50 10/16/23 1427   Temp 36 10/16/23 1427   Pulse 81 10/16/23 1427   Resp 12 10/16/23 1427   SpO2 99 10/16/23 1427       Anesthesia Post Evaluation    Patient location during evaluation: PACU  Patient participation: complete - patient participated  Level of consciousness: awake and alert  Pain score: 2  Pain management: adequate  Cardiovascular status: acceptable  Respiratory status: acceptable      There were no known notable events for this encounter.

## 2023-10-16 NOTE — DISCHARGE INSTRUCTIONS
Patient Instructions after a Colonoscopy      The anesthetics, sedatives or narcotics which were given to you today will be acting in your body for the next 24 hours, so you might feel a little sleepy or groggy.  This feeling should slowly wear off. Carefully read and follow the instructions.     You received sedation today:  - Do not drive or operate any machinery or power tools of any kind.   - No alcoholic beverages today, not even beer or wine.  - Do not make any important decisions or sign any legal documents.  - No over the counter medications that contain alcohol or that may cause drowsiness.  - Do not make any important decisions or sign any legal documents.    While it is common to experience mild to moderate abdominal distention, gas, or belching after your procedure, if any of these symptoms occur following discharge from the GI Lab or within one week of having your procedure, call the [facility] to be advised whether a visit to your nearest Urgent Care or Emergency Department is indicated.  Take this paper with you if you go.     - If you develop an allergic reaction to the medications that were given during your procedure such as difficulty breathing, rash, hives, severe nausea, vomiting or lightheadedness.  - If you experience chest pain, shortness of breath, severe abdominal pain, fevers and chills.  -If you develop signs and symptoms of bleeding such as blood in your spit, if your stools turn black, tarry, or bloody  - If you have not urinated within 8 hours following your procedure.  - If your IV site becomes painful, red, inflamed, or looks infected.    If you received a biopsy/polypectomy/sphincterotomy the following instructions apply below:    __ Do not use Aspirin containing products, non-steroidal medications or anti-coagulants for one week following your procedure. (Examples of these types of medications are: Advil, Arthrotec, Aleve, Coumadin, Ecotrin, Heparin, Ibuprofen,  Indocin, Motrin, Naprosyn, Nuprin, Plavix, Vioxx, and Voltarin, or their generic forms.  This list is not all-inclusive.  Check with your physician or pharmacist before resuming medications.)   __ Eat a soft diet today.  Avoid foods that are poorly digested for the next 24 hours.  These foods would include: nuts, beans, lettuce, red meats, and fried foods. Start with liquids and advance your diet as tolerated, gradually work up to eating solids.   __ Do not have a Barium Study or Enema for one week.    Your physician recommends the additional following instructions:    -You have a contact number available for emergencies. The signs and symptoms of potential delayed complications were discussed with you. You may return to normal activities tomorrow.  -Resume your previous diet.  -Continue your present medications.   -We are waiting for your pathology results.  -Your physician has recommended a repeat colonoscopy (date to be determined after pending pathology results are reviewed) for surveillance based on pathology results.  -The findings and recommendations have been discussed with you.  -The findings and recommendations were discussed with your family.  - Please see Medication Reconciliation Form for new medication/medications prescribed.       If you experience any problems or have any questions following discharge from the GI Lab, please call:    [phone]

## 2023-10-16 NOTE — H&P
Outpatient Hospital Procedure    Patient Profile-Procedures  Initial Info  Patient Demographics  Name Erica Smith  Date of Birth 1975  MRN [id number]  Address   [address] [address] [id number]  [place] [facility] [address]    Primary Phone Number [phone]  Secondary Phone Number    PCP Jesus Gary    Procedures   Colonoscopy      Indication:  Screening - mom with CRC 60's    Primary contact name and number   Extended Emergency Contact Information  Primary Emergency Contact: Ludin Plummer  Address: [address] [place] [address] [facility]  Home Phone: [phone]  Work Phone: [phone]  Mobile Phone: [phone]  Relation: Spouse  Secondary Emergency Contact: Nam Strauss  Address: [address] [place] [address] [facility]  Home Phone: [phone]  Mobile Phone: [phone]  Relation: Daughter    General Health  Weight   Vitals:    10/16/23 1225   Weight: 55.8 kg (123 lb)     BMI Body mass index is 23.24 kg/m².    Allergies  Allergies   Allergen Reactions    Hamster Protein Shortness of breath     Wheezing and SOB from Hamster       Past Medical History   Past Medical History:   Diagnosis Date    Encounter for general adult medical examination without abnormal findings 11/15/2019    Routine general medical examination at a health care facility    Encounter for general adult medical examination without abnormal findings 09/26/2022    Well adult exam    Encounter for immunization 09/26/2022    Encounter for immunization    Encounter for other screening for malignant neoplasm of breast 10/18/2021    Breast cancer screening    Other conditions influencing health status     No significant past medical history    Personal history of other diseases of the musculoskeletal system and connective tissue 10/21/2021    History of tendinitis    Personal history of other diseases of the musculoskeletal system  and connective tissue 12/20/2016    History of neck pain       Provider assessment  Diagnosis  Medication Reviewed - yes  Prior to Admission medications    Medication Sig Start Date End Date Taking? Authorizing Provider   aspirin 81 mg EC tablet Take 1 tablet (81 mg) by mouth once daily.    Historical Provider, MD   atorvastatin (Lipitor) 20 mg tablet Take 1 tablet (20 mg) by mouth once daily. 8/24/23   Shivam Soto MD   magnesium 250 mg tablet Take by mouth.    Historical Provider, MD   methylphenidate (Concerta) 54 mg ER tablet Take 1 tablet (54 mg) by mouth once daily in the morning. Do not crush, chew, or split. 9/20/23 10/20/23  Jesus Gary MD       This is my H&P    Physical Exam  Physical Exam  Constitutional:       Comments: Awake   HENT:      Head: Normocephalic.   Cardiovascular:      Rate and Rhythm: Normal rate and regular rhythm.   Pulmonary:      Effort: Pulmonary effort is normal.      Breath sounds: Normal breath sounds.   Abdominal:      General: Bowel sounds are normal.      Palpations: Abdomen is soft.   Neurological:      Mental Status: She is alert.   Psychiatric:         Mood and Affect: Mood normal.           Oropharyngeal Classification II (hard and soft palate, upper portion of tonsils anduvula visible)  ASA PS Classification 2  Sedation Plan Deep  Procedure Plan - pre-procedural (re)assesment completed by physician:  discharge/transfer patient when discharge criteria met    Kaylin Quiñonez MD  10/16/2023 1:55 PM

## 2023-10-19 DIAGNOSIS — F98.8 ATTENTION DEFICIT DISORDER, UNSPECIFIED HYPERACTIVITY PRESENCE: ICD-10-CM

## 2023-10-19 RX ORDER — METHYLPHENIDATE HYDROCHLORIDE 54 MG/1
54 TABLET ORAL EVERY MORNING
Qty: 30 TABLET | Refills: 0 | Status: SHIPPED | OUTPATIENT
Start: 2023-10-19 | End: 2023-11-17 | Stop reason: SDUPTHER

## 2023-10-19 NOTE — TELEPHONE ENCOUNTER
Rx Controlled Refill Request Telephone Encounter    Name: Erica BachGawlik  :  1975    Medication Name:   CONCERTA   Dose (Optional):   54 MG   Quantity (Optional):   30 TABLETS   Directions (Optional):   TAKE 1 TABLET BY MOUTH ONCE DAILY IN THE MORNING.  DO CRUSH,CHEW OR SPLIT     ALLERGIES:    HAMSTER PROTEIN     LAST DRUG SCREEN:     23  LAST MED CONTRACT:    23    Specific Pharmacy location:    [facility]/pharmacy #[facility]     Date of last appointment:    23  Date of next appointment:    NOT SCHEDULED     Best number to reach patient:    [phone]

## 2023-10-27 ENCOUNTER — APPOINTMENT (OUTPATIENT)
Dept: RADIOLOGY | Facility: CLINIC | Age: 48
End: 2023-10-27
Payer: COMMERCIAL

## 2023-11-17 DIAGNOSIS — F98.8 ATTENTION DEFICIT DISORDER, UNSPECIFIED HYPERACTIVITY PRESENCE: ICD-10-CM

## 2023-11-17 RX ORDER — METHYLPHENIDATE HYDROCHLORIDE 54 MG/1
54 TABLET ORAL EVERY MORNING
Qty: 30 TABLET | Refills: 0 | Status: SHIPPED | OUTPATIENT
Start: 2023-11-17 | End: 2023-12-20 | Stop reason: SDUPTHER

## 2023-11-17 NOTE — TELEPHONE ENCOUNTER
Rx Controlled Refill Request Telephone Encounter    Name: Erica BachGawlik  :  1975    Medication Name:   methylphenidate (Concerta) 54 mg ER tablet   Quantity (Optional):   30  Directions (Optional):   Take 1 tablet (54 mg) by mouth once daily in the morning. Do not crush, chew, or split.     LAST DRUG SCREEN:       LAST MED CONTRACT:        Specific Pharmacy location:    [facility]    Date of last appointment:    23

## 2023-11-20 ENCOUNTER — APPOINTMENT (OUTPATIENT)
Dept: RADIOLOGY | Facility: CLINIC | Age: 48
End: 2023-11-20
Payer: COMMERCIAL

## 2023-11-27 ENCOUNTER — APPOINTMENT (OUTPATIENT)
Dept: RADIOLOGY | Facility: CLINIC | Age: 48
End: 2023-11-27
Payer: COMMERCIAL

## 2023-12-20 DIAGNOSIS — F98.8 ATTENTION DEFICIT DISORDER, UNSPECIFIED HYPERACTIVITY PRESENCE: ICD-10-CM

## 2023-12-20 RX ORDER — METHYLPHENIDATE HYDROCHLORIDE 54 MG/1
54 TABLET ORAL EVERY MORNING
Qty: 30 TABLET | Refills: 0 | Status: SHIPPED | OUTPATIENT
Start: 2023-12-20 | End: 2024-01-19 | Stop reason: SDUPTHER

## 2023-12-20 NOTE — TELEPHONE ENCOUNTER
Rx Refill Request Telephone Encounter    Name:  Erica Sotelorupalarlene  : 1975     Medication Name:  methylphenidate ER (Concerta) 54 mg ER tablet   Quantity (Optional):    30 REFILL: 2  Directions (Optional):   Take 1 tablet (54 mg) by mouth once daily in the morning. Do not crush, chew, or split.     Specific Pharmacy location:  [facility]    Date of last appointment:  23

## 2023-12-21 ENCOUNTER — APPOINTMENT (OUTPATIENT)
Dept: RADIOLOGY | Facility: CLINIC | Age: 48
End: 2023-12-21
Payer: COMMERCIAL

## 2024-01-15 ENCOUNTER — APPOINTMENT (OUTPATIENT)
Dept: RADIOLOGY | Facility: CLINIC | Age: 49
End: 2024-01-15
Payer: COMMERCIAL

## 2024-01-19 DIAGNOSIS — F98.8 ATTENTION DEFICIT DISORDER, UNSPECIFIED HYPERACTIVITY PRESENCE: ICD-10-CM

## 2024-01-19 RX ORDER — METHYLPHENIDATE HYDROCHLORIDE 54 MG/1
54 TABLET ORAL EVERY MORNING
Qty: 30 TABLET | Refills: 0 | Status: SHIPPED | OUTPATIENT
Start: 2024-01-19 | End: 2024-02-20 | Stop reason: SDUPTHER

## 2024-01-19 NOTE — TELEPHONE ENCOUNTER
Rx Refill Request Telephone Encounter    Name:  Erica Sotelorupalarlene  : 1975     Medication Name:  methylphenidate ER (Concerta) 54 mg ER tablet   Quantity (Optional):    30  Directions (Optional):   Take 1 tablet (54 mg) by mouth once daily in the morning. Do not crush, chew, or split.     Specific Pharmacy location:  [facility]    Date of last appointment:  23

## 2024-02-12 ENCOUNTER — APPOINTMENT (OUTPATIENT)
Dept: RADIOLOGY | Facility: CLINIC | Age: 49
End: 2024-02-12
Payer: COMMERCIAL

## 2024-02-16 ENCOUNTER — APPOINTMENT (OUTPATIENT)
Dept: RADIOLOGY | Facility: CLINIC | Age: 49
End: 2024-02-16
Payer: COMMERCIAL

## 2024-02-20 DIAGNOSIS — F98.8 ATTENTION DEFICIT DISORDER, UNSPECIFIED HYPERACTIVITY PRESENCE: ICD-10-CM

## 2024-02-20 RX ORDER — METHYLPHENIDATE HYDROCHLORIDE 54 MG/1
54 TABLET ORAL EVERY MORNING
Qty: 30 TABLET | Refills: 0 | Status: SHIPPED | OUTPATIENT
Start: 2024-02-20 | End: 2024-04-01 | Stop reason: SDUPTHER

## 2024-02-20 NOTE — TELEPHONE ENCOUNTER
Rx Refill Request Telephone Encounter    Name:  Erica Smith  : 1975     Medication Name:  methylphenidate ER (Concerta) 54 mg ER tablet   Quantity (Optional):    30  Directions (Optional):   Take 1 tablet (54 mg) by mouth once daily in the morning. Do not crush, chew, or split.     Specific Pharmacy location:  [facility]     Date of last appointment:  23

## 2024-03-29 ENCOUNTER — APPOINTMENT (OUTPATIENT)
Dept: RADIOLOGY | Facility: CLINIC | Age: 49
End: 2024-03-29
Payer: COMMERCIAL

## 2024-04-01 ENCOUNTER — OFFICE VISIT (OUTPATIENT)
Dept: PRIMARY CARE | Facility: CLINIC | Age: 49
End: 2024-04-01
Payer: COMMERCIAL

## 2024-04-01 VITALS
TEMPERATURE: 98 F | HEIGHT: 61 IN | DIASTOLIC BLOOD PRESSURE: 74 MMHG | WEIGHT: 118.6 LBS | SYSTOLIC BLOOD PRESSURE: 120 MMHG | OXYGEN SATURATION: 95 % | BODY MASS INDEX: 22.39 KG/M2 | HEART RATE: 84 BPM

## 2024-04-01 DIAGNOSIS — E78.00 PURE HYPERCHOLESTEROLEMIA: ICD-10-CM

## 2024-04-01 DIAGNOSIS — Z00.00 ROUTINE GENERAL MEDICAL EXAMINATION AT A HEALTH CARE FACILITY: Primary | ICD-10-CM

## 2024-04-01 DIAGNOSIS — E78.5 DYSLIPIDEMIA: ICD-10-CM

## 2024-04-01 DIAGNOSIS — F98.8 ATTENTION DEFICIT DISORDER, UNSPECIFIED HYPERACTIVITY PRESENCE: ICD-10-CM

## 2024-04-01 DIAGNOSIS — R53.83 FATIGUE, UNSPECIFIED TYPE: ICD-10-CM

## 2024-04-01 PROCEDURE — 99396 PREV VISIT EST AGE 40-64: CPT | Performed by: FAMILY MEDICINE

## 2024-04-01 PROCEDURE — 3008F BODY MASS INDEX DOCD: CPT | Performed by: FAMILY MEDICINE

## 2024-04-01 PROCEDURE — 1036F TOBACCO NON-USER: CPT | Performed by: FAMILY MEDICINE

## 2024-04-01 RX ORDER — METHYLPHENIDATE HYDROCHLORIDE 54 MG/1
54 TABLET ORAL EVERY MORNING
Qty: 30 TABLET | Refills: 0 | Status: SHIPPED | OUTPATIENT
Start: 2024-04-01 | End: 2024-05-02 | Stop reason: SDUPTHER

## 2024-04-01 RX ORDER — METHYLPHENIDATE HYDROCHLORIDE 18 MG/1
18 TABLET ORAL EVERY MORNING
Qty: 30 TABLET | Refills: 0 | Status: SHIPPED | OUTPATIENT
Start: 2024-04-01 | End: 2024-05-02 | Stop reason: SDUPTHER

## 2024-04-01 ASSESSMENT — PATIENT HEALTH QUESTIONNAIRE - PHQ9
2. FEELING DOWN, DEPRESSED OR HOPELESS: NOT AT ALL
1. LITTLE INTEREST OR PLEASURE IN DOING THINGS: NOT AT ALL
SUM OF ALL RESPONSES TO PHQ9 QUESTIONS 1 AND 2: 0

## 2024-04-01 NOTE — PROGRESS NOTES
"Subjective   Patient ID: Erica Smith is a 48 y.o. female who presents for Annual Exam and ADD.  HPI    Annual physical   Eats a generally healthy diet   Exercises  Denies any chest pain,SOB  No Abdominal pain   No black or bloody stools   Urination/BM normal   No new family h/o cancers or heart disease    ADD follow up  Taking the Concerta 54 MG   Working well   80% effective. Would like to discuss increasing the dose.   Denies any side effects   OARRS reviewed today   CSA 8/23/23  UDS 8-23-23      Quit her Atorvastatin due to it causing hand cramps.       Taking current medications which were reviewed.  Problem list discussed.     Overall doing well.  Eating okay.  Staying active.     Has no other new problem /question.  ROS  Constitutional- No activity change. No appetite change.  Eyes- Denies vision changes.  Respiratory- No shortness of breath.  Cardiovascular- No palpitations. No chest pain.  GI- No nausea or vomiting. No diarrhea or constipation. Denies abdominal pain.  Musculoskeletal- Denies joint swelling.  Extremities- No edema.  Neurological- Denies headaches. Denies dizziness.  Skin- No rashes.  Psychiatric/Behavioral- Denies significant anxiety, or depressed mood.     Objective     /74   Pulse 84   Temp 36.7 °C (98 °F)   Ht 1.549 m (5' 1\")   Wt 53.8 kg (118 lb 9.6 oz)   SpO2 95%   BMI 22.41 kg/m²     Allergies   Allergen Reactions    Hamster Protein Shortness of breath     Wheezing and SOB from Hamster       Constitutional-- Well-nourished.  No distress  Head- unremarkable.  Eyes- PERRL.  Conjunctiva normal.  Nose- Normal.  No rhinorrhea noted.  Throat- Oropharynx is clear and moist.  Neck- Supple with no thyromegaly.  No significant cervical adenopathy noted.  Pulmonary/Chest- Breath sounds normal with normal effort.  No wheezing.  Heart- Regular rate and rhythm.  No murmur.  Abdomen- Soft and non-tender.  No masses noted.  Musculoskeletal- Normal ROM.  No significant joint " swelling  Extremities- No edema.   Neurological- Alert.  No noted deficits.  Skin- Warm.  No rashes.  Psychiatric/Behavioral- Mood and affect normal.  Behavior normal.     Assessment/Plan   1. Routine general medical examination at a health care facility  Lipid Panel    CBC and Auto Differential    Comprehensive Metabolic Panel      2. Attention deficit disorder, unspecified hyperactivity presence  CBC and Auto Differential    Comprehensive Metabolic Panel    methylphenidate ER (Concerta) 54 mg ER tablet    methylphenidate ER (Concerta) 18 mg daily tablet      3. Dyslipidemia  Lipid Panel      4. Pure hypercholesterolemia        5. Fatigue, unspecified type  CBC and Auto Differential    Comprehensive Metabolic Panel      6. BMI 22.0-22.9, adult               Long talk. Treatment options reviewed.    Continue and take your medications as prescribed.  ADD under fair control.  Will increase Concerta as instructed.  She will take the extra pill on workdays.  Health Maintenance issues discussed.    Importance of healthy diet and regular exercise regimen discussed.    We will contact you with any test results ordered. If you do not hear from us, please contact.    Follow-up as instructed or sooner if any problems or symptoms do not resolve as expected.

## 2024-04-15 ENCOUNTER — APPOINTMENT (OUTPATIENT)
Dept: RADIOLOGY | Facility: CLINIC | Age: 49
End: 2024-04-15
Payer: COMMERCIAL

## 2024-04-26 ENCOUNTER — HOSPITAL ENCOUNTER (OUTPATIENT)
Dept: RADIOLOGY | Facility: CLINIC | Age: 49
Discharge: HOME | End: 2024-04-26
Payer: COMMERCIAL

## 2024-04-26 DIAGNOSIS — Z12.31 VISIT FOR SCREENING MAMMOGRAM: ICD-10-CM

## 2024-04-26 PROCEDURE — 77067 SCR MAMMO BI INCL CAD: CPT | Mod: BILATERAL PROCEDURE | Performed by: RADIOLOGY

## 2024-04-26 PROCEDURE — 77063 BREAST TOMOSYNTHESIS BI: CPT | Mod: BILATERAL PROCEDURE | Performed by: RADIOLOGY

## 2024-04-26 PROCEDURE — 77067 SCR MAMMO BI INCL CAD: CPT

## 2024-04-27 ENCOUNTER — LAB (OUTPATIENT)
Dept: LAB | Facility: LAB | Age: 49
End: 2024-04-27
Payer: COMMERCIAL

## 2024-04-27 DIAGNOSIS — F98.8 ATTENTION DEFICIT DISORDER, UNSPECIFIED HYPERACTIVITY PRESENCE: ICD-10-CM

## 2024-04-27 DIAGNOSIS — E78.5 DYSLIPIDEMIA: ICD-10-CM

## 2024-04-27 DIAGNOSIS — Z00.00 ROUTINE GENERAL MEDICAL EXAMINATION AT A HEALTH CARE FACILITY: ICD-10-CM

## 2024-04-27 DIAGNOSIS — R53.83 FATIGUE, UNSPECIFIED TYPE: ICD-10-CM

## 2024-04-27 LAB
ALBUMIN SERPL BCP-MCNC: 4.6 G/DL (ref 3.4–5)
ALP SERPL-CCNC: 47 U/L (ref 33–110)
ALT SERPL W P-5'-P-CCNC: 13 U/L (ref 7–45)
ANION GAP SERPL CALC-SCNC: 11 MMOL/L (ref 10–20)
AST SERPL W P-5'-P-CCNC: 18 U/L (ref 9–39)
BASOPHILS # BLD AUTO: 0.04 X10*3/UL (ref 0–0.1)
BASOPHILS NFR BLD AUTO: 0.9 %
BILIRUB SERPL-MCNC: 0.5 MG/DL (ref 0–1.2)
BUN SERPL-MCNC: 11 MG/DL (ref 6–23)
CALCIUM SERPL-MCNC: 9.8 MG/DL (ref 8.6–10.3)
CHLORIDE SERPL-SCNC: 105 MMOL/L (ref 98–107)
CHOLEST SERPL-MCNC: 254 MG/DL (ref 0–199)
CHOLESTEROL/HDL RATIO: 2.6
CO2 SERPL-SCNC: 28 MMOL/L (ref 21–32)
CREAT SERPL-MCNC: 0.87 MG/DL (ref 0.5–1.05)
EGFRCR SERPLBLD CKD-EPI 2021: 81 ML/MIN/1.73M*2
EOSINOPHIL # BLD AUTO: 0.1 X10*3/UL (ref 0–0.7)
EOSINOPHIL NFR BLD AUTO: 2.3 %
ERYTHROCYTE [DISTWIDTH] IN BLOOD BY AUTOMATED COUNT: 13.3 % (ref 11.5–14.5)
GLUCOSE SERPL-MCNC: 83 MG/DL (ref 74–99)
HCT VFR BLD AUTO: 42.3 % (ref 36–46)
HDLC SERPL-MCNC: 98 MG/DL
HGB BLD-MCNC: 14 G/DL (ref 12–16)
IMM GRANULOCYTES # BLD AUTO: 0.02 X10*3/UL (ref 0–0.7)
IMM GRANULOCYTES NFR BLD AUTO: 0.5 % (ref 0–0.9)
LDLC SERPL CALC-MCNC: 141 MG/DL
LYMPHOCYTES # BLD AUTO: 1.65 X10*3/UL (ref 1.2–4.8)
LYMPHOCYTES NFR BLD AUTO: 37.8 %
MCH RBC QN AUTO: 31.5 PG (ref 26–34)
MCHC RBC AUTO-ENTMCNC: 33.1 G/DL (ref 32–36)
MCV RBC AUTO: 95 FL (ref 80–100)
MONOCYTES # BLD AUTO: 0.39 X10*3/UL (ref 0.1–1)
MONOCYTES NFR BLD AUTO: 8.9 %
NEUTROPHILS # BLD AUTO: 2.16 X10*3/UL (ref 1.2–7.7)
NEUTROPHILS NFR BLD AUTO: 49.6 %
NON HDL CHOLESTEROL: 156 MG/DL (ref 0–149)
NRBC BLD-RTO: 0 /100 WBCS (ref 0–0)
PLATELET # BLD AUTO: 255 X10*3/UL (ref 150–450)
POTASSIUM SERPL-SCNC: 4.5 MMOL/L (ref 3.5–5.3)
PROT SERPL-MCNC: 7.1 G/DL (ref 6.4–8.2)
RBC # BLD AUTO: 4.45 X10*6/UL (ref 4–5.2)
SODIUM SERPL-SCNC: 139 MMOL/L (ref 136–145)
TRIGL SERPL-MCNC: 75 MG/DL (ref 0–149)
VLDL: 15 MG/DL (ref 0–40)
WBC # BLD AUTO: 4.4 X10*3/UL (ref 4.4–11.3)

## 2024-04-27 PROCEDURE — 80053 COMPREHEN METABOLIC PANEL: CPT

## 2024-04-27 PROCEDURE — 36415 COLL VENOUS BLD VENIPUNCTURE: CPT

## 2024-04-27 PROCEDURE — 85025 COMPLETE CBC W/AUTO DIFF WBC: CPT

## 2024-04-27 PROCEDURE — 80061 LIPID PANEL: CPT

## 2024-04-29 ENCOUNTER — TELEPHONE (OUTPATIENT)
Dept: PRIMARY CARE | Facility: CLINIC | Age: 49
End: 2024-04-29
Payer: COMMERCIAL

## 2024-04-29 NOTE — TELEPHONE ENCOUNTER
In regards to blood work. Please advise.    ----- Message from Erica Smith sent at 4/29/2024  8:04 AM EDT -----  Regarding: Lab results  Contact: [phone]  Thank you , so there’s no need to start taking the 20mg atorvastatin again?

## 2024-04-29 NOTE — TELEPHONE ENCOUNTER
LMOM for patient to call back.     Jesus Gary MD  [id number] [place] [id number] Clinical Support Staff1 hour ago (11:58 AM)       Please let her know that given her high level of good cholesterol and with her bad cholesterol at a reasonable level she does not need to take the statin medication if she prefers not to.  Just remind her she needs to really stick with a low-cholesterol diet.  Thanks

## 2024-05-02 DIAGNOSIS — F98.8 ATTENTION DEFICIT DISORDER, UNSPECIFIED HYPERACTIVITY PRESENCE: ICD-10-CM

## 2024-05-02 RX ORDER — METHYLPHENIDATE HYDROCHLORIDE 54 MG/1
54 TABLET ORAL EVERY MORNING
Qty: 30 TABLET | Refills: 0 | Status: SHIPPED | OUTPATIENT
Start: 2024-05-02 | End: 2024-05-04 | Stop reason: SDUPTHER

## 2024-05-02 RX ORDER — METHYLPHENIDATE HYDROCHLORIDE 18 MG/1
18 TABLET ORAL EVERY MORNING
Qty: 30 TABLET | Refills: 0 | Status: SHIPPED | OUTPATIENT
Start: 2024-05-02 | End: 2024-05-04 | Stop reason: SDUPTHER

## 2024-05-02 NOTE — TELEPHONE ENCOUNTER
Rx Refill Request Telephone Encounter    Name:  Erica KincaidLeo  :  [id number]  Medication Name:  methylphenidate ER (Concerta) 18 mg daily tablet   Medication Name:  methylphenidate ER (Concerta) 54 mg ER tablet     Specific Pharmacy location:  [facility]   Date of last appointment:  2024  Date of next appointment: none   Best number to reach patient:  [phone]

## 2024-05-03 ENCOUNTER — HOSPITAL ENCOUNTER (OUTPATIENT)
Dept: RADIOLOGY | Facility: EXTERNAL LOCATION | Age: 49
Discharge: HOME | End: 2024-05-03
Payer: COMMERCIAL

## 2024-05-03 ENCOUNTER — DOCUMENTATION (OUTPATIENT)
Dept: PRIMARY CARE | Facility: CLINIC | Age: 49
End: 2024-05-03
Payer: COMMERCIAL

## 2024-05-03 DIAGNOSIS — Z12.31 VISIT FOR SCREENING MAMMOGRAM: ICD-10-CM

## 2024-05-03 NOTE — PROGRESS NOTES
RESPONDED TO ON CALL MESSAGE (VIA [employer]): I called and talked to patient. She states Methylphenidate was sent to wrong pharmacy yesterday. I sent message to Dr Gary.

## 2024-05-04 DIAGNOSIS — F98.8 ATTENTION DEFICIT DISORDER, UNSPECIFIED HYPERACTIVITY PRESENCE: ICD-10-CM

## 2024-05-04 RX ORDER — METHYLPHENIDATE HYDROCHLORIDE 54 MG/1
54 TABLET ORAL EVERY MORNING
Qty: 30 TABLET | Refills: 0 | Status: SHIPPED | OUTPATIENT
Start: 2024-05-04 | End: 2024-05-30 | Stop reason: SDUPTHER

## 2024-05-04 RX ORDER — METHYLPHENIDATE HYDROCHLORIDE 18 MG/1
18 TABLET ORAL EVERY MORNING
Qty: 30 TABLET | Refills: 0 | Status: SHIPPED | OUTPATIENT
Start: 2024-05-04 | End: 2024-05-30 | Stop reason: SDUPTHER

## 2024-05-30 DIAGNOSIS — F98.8 ATTENTION DEFICIT DISORDER, UNSPECIFIED HYPERACTIVITY PRESENCE: ICD-10-CM

## 2024-05-30 RX ORDER — METHYLPHENIDATE HYDROCHLORIDE 54 MG/1
54 TABLET ORAL EVERY MORNING
Qty: 30 TABLET | Refills: 0 | Status: SHIPPED | OUTPATIENT
Start: 2024-05-30 | End: 2024-06-29

## 2024-05-30 RX ORDER — METHYLPHENIDATE HYDROCHLORIDE 18 MG/1
18 TABLET ORAL EVERY MORNING
Qty: 30 TABLET | Refills: 0 | Status: SHIPPED | OUTPATIENT
Start: 2024-05-30 | End: 2024-06-29

## 2024-05-30 NOTE — TELEPHONE ENCOUNTER
Rx Refill Request Telephone Encounter    Name:  Erica KincaidLeo  :  [id number]  Medication Name:  methylphenidate ER (Concerta) 18 mg extended release tablet   Medication Name:  methylphenidate ER 54 mg extended release tablet         Specific Pharmacy location:  [facility]   Date of last appointment:  2024  Date of next appointment:  none   Best number to reach patient:  [phone]

## 2024-07-09 DIAGNOSIS — F98.8 ATTENTION DEFICIT DISORDER, UNSPECIFIED HYPERACTIVITY PRESENCE: Primary | ICD-10-CM

## 2024-07-09 RX ORDER — METHYLPHENIDATE HYDROCHLORIDE 54 MG/1
54 TABLET ORAL EVERY MORNING
Qty: 30 TABLET | Refills: 0 | Status: SHIPPED | OUTPATIENT
Start: 2024-07-09 | End: 2024-08-08

## 2024-07-09 RX ORDER — METHYLPHENIDATE HYDROCHLORIDE 18 MG/1
18 TABLET ORAL EVERY MORNING
Qty: 30 TABLET | Refills: 0 | Status: SHIPPED | OUTPATIENT
Start: 2024-07-09 | End: 2024-08-08

## 2024-07-09 NOTE — TELEPHONE ENCOUNTER
Patient is calling to request 2 RX refills:    Rx Controlled Refill Request Telephone Encounter    Name: Erica BachGawliazul  :  1975    Medication Name:   Concerta  Dose (Optional):   18mg  Quantity (Optional):   #30  Directions (Optional):   Take 1 tablet (18 mg) by mouth once daily in the morning. Do not crush, chew or split.     Medication Name:   methylphenidate ER  Dose (Optional):   54mg  Quantity (Optional):   #30  Directions (Optional):   Take 1 tablet (54mg) by mouth once daily in the morning. Do not crush, chew or split.       ALLERGIES:    see list     LAST DRUG SCREEN:     23  LAST MED CONTRACT:    23    Specific Pharmacy location:    [facility]    Date of last appointment:    24  Date of next appointment:    none     Best number to reach patient:    [phone]

## 2024-08-08 DIAGNOSIS — F98.8 ATTENTION DEFICIT DISORDER, UNSPECIFIED HYPERACTIVITY PRESENCE: ICD-10-CM

## 2024-08-08 RX ORDER — METHYLPHENIDATE HYDROCHLORIDE 18 MG/1
18 TABLET ORAL EVERY MORNING
Qty: 30 TABLET | Refills: 0 | Status: SHIPPED | OUTPATIENT
Start: 2024-08-08 | End: 2024-09-07

## 2024-08-08 RX ORDER — METHYLPHENIDATE HYDROCHLORIDE 54 MG/1
54 TABLET ORAL EVERY MORNING
Qty: 30 TABLET | Refills: 0 | Status: SHIPPED | OUTPATIENT
Start: 2024-08-08 | End: 2024-09-07

## 2024-08-08 NOTE — TELEPHONE ENCOUNTER
Rx Controlled Refill Request Telephone Encounter    Name: Erica Kincaid-Gawlik  :  1975    Medication Name:   METHYLPHENIDATE ER  Dose (Optional):   18 MG  Quantity (Optional):   30  Directions (Optional):   1 TABLET DAILY    Medication Name:   METHYLPHENIDATE ER  Dose (Optional):   54 MG  Quantity (Optional):   30  Directions (Optional):   1 TABLET DAILY    ALLERGIES:    ON FILE    LAST DRUG SCREEN:     23  LAST MED CONTRACT:    23    Specific Pharmacy location:    [facility] [place]    Date of last appointment:    24  Date of next appointment:    NONE    Best number to reach patient:    [phone]     Ftsg Text: The defect edges were debeveled with a #15 scalpel blade.  Given the location of the defect, shape of the defect and the proximity to free margins a full thickness skin graft was deemed most appropriate.  Using a sterile surgical marker, the primary defect shape was transferred to the donor site. The area thus outlined was incised deep to adipose tissue with a #15 scalpel blade.  The harvested graft was then trimmed of adipose tissue until only dermis and epidermis was left.  The skin margins of the secondary defect were undermined to an appropriate distance in all directions utilizing iris scissors.  The secondary defect was closed with interrupted buried subcutaneous sutures.  The skin edges were then re-apposed with running  sutures.  The skin graft was then placed in the primary defect and oriented appropriately.

## 2024-09-10 ENCOUNTER — APPOINTMENT (OUTPATIENT)
Dept: PRIMARY CARE | Facility: CLINIC | Age: 49
End: 2024-09-10
Payer: COMMERCIAL

## 2024-09-10 DIAGNOSIS — F98.8 ATTENTION DEFICIT DISORDER, UNSPECIFIED HYPERACTIVITY PRESENCE: ICD-10-CM

## 2024-09-10 NOTE — TELEPHONE ENCOUNTER
Patient is calling to request 2 RX refill requests    Rx Controlled Refill Request Telephone Encounter    Name: Erica BachGaswetha  :  1975    Medication Name:   Concerta  Dose (Optional):   18 mg  Quantity (Optional):   #30  Directions (Optional):   Take 1 tablet (18 mg) by mouth once daily in the morning. Do not crush, chew or split.     ALLERGIES:    see list     LAST DRUG SCREEN:     23 she works till 430pm on 24 can come in for UDS and CSA 24 and can be here at 445pm  LAST MED CONTRACT:      23 she works till 430pm on 24 can come in for UDS and CSA 24 and can be here at 445pm    Specific Pharmacy location:    [facility]    Date of last appointment:    24  Date of next appointment:    none     Best number to reach patient:    [phone]

## 2024-09-11 ENCOUNTER — CLINICAL SUPPORT (OUTPATIENT)
Dept: PRIMARY CARE | Facility: CLINIC | Age: 49
End: 2024-09-11
Payer: COMMERCIAL

## 2024-09-11 DIAGNOSIS — Z51.81 THERAPEUTIC DRUG MONITORING: ICD-10-CM

## 2024-09-11 PROCEDURE — 80368 SEDATIVE HYPNOTICS: CPT

## 2024-09-11 PROCEDURE — 80365 DRUG SCREENING OXYCODONE: CPT

## 2024-09-11 PROCEDURE — 80361 OPIATES 1 OR MORE: CPT

## 2024-09-11 PROCEDURE — 80346 BENZODIAZEPINES1-12: CPT

## 2024-09-11 PROCEDURE — 80373 DRUG SCREENING TRAMADOL: CPT

## 2024-09-11 PROCEDURE — 82570 ASSAY OF URINE CREATININE: CPT

## 2024-09-11 PROCEDURE — 80358 DRUG SCREENING METHADONE: CPT

## 2024-09-11 PROCEDURE — 81003 URINALYSIS AUTO W/O SCOPE: CPT

## 2024-09-11 PROCEDURE — 80354 DRUG SCREENING FENTANYL: CPT

## 2024-09-11 PROCEDURE — 80307 DRUG TEST PRSMV CHEM ANLYZR: CPT

## 2024-09-11 RX ORDER — METHYLPHENIDATE HYDROCHLORIDE 18 MG/1
18 TABLET ORAL EVERY MORNING
Qty: 30 TABLET | Refills: 0 | Status: SHIPPED | OUTPATIENT
Start: 2024-09-11 | End: 2024-10-11

## 2024-09-11 NOTE — PROGRESS NOTES
Patient presents in the office today for a UDS and to sign a contract. Patient is currently taking Concerta and last took on 9/11/24.

## 2024-09-12 LAB
AMPHETAMINES UR QL SCN: ABNORMAL
BARBITURATES UR QL SCN: ABNORMAL
BZE UR QL SCN: ABNORMAL
CANNABINOIDS UR QL SCN: ABNORMAL
CREAT UR-MCNC: 11.1 MG/DL (ref 20–320)
PCP UR QL SCN: ABNORMAL
SP GR UR STRIP.AUTO: 1

## 2024-09-13 DIAGNOSIS — F98.8 ATTENTION DEFICIT DISORDER, UNSPECIFIED HYPERACTIVITY PRESENCE: ICD-10-CM

## 2024-09-13 RX ORDER — METHYLPHENIDATE HYDROCHLORIDE 54 MG/1
54 TABLET ORAL EVERY MORNING
Qty: 30 TABLET | Refills: 0 | Status: SHIPPED | OUTPATIENT
Start: 2024-09-13 | End: 2024-10-13

## 2024-09-13 NOTE — TELEPHONE ENCOUNTER
THE CONCERTA 18 MG WAS SENT IN BUT NOT THE 54 MG.    Rx Controlled Refill Request Telephone Encounter    Name: Erica Smith  :  1975    methylphenidate ER 54 mg extended release tablet [[id number]]      Order Details    Dose: 54 mg Route: oral Frequency: Every morning   Dispense Quantity: 30 tablet Refills: 0    Note to Pharmacy: *30 DAY SUPPLY*         Sig: Take 1 tablet (54 mg) by mouth once daily in the morning. Do not crush, chew, or split.             ALLERGIES:    SEE ATTACHED    LAST DRUG SCREEN:     2024  LAST MED CONTRACT:    2024    Specific Pharmacy location:    [facility]    Date of last appointment:    2024  Date of next appointment:    NONE    Best number to reach patient:    [phone]

## 2024-09-15 LAB
AMPHET UR-MCNC: <50 NG/ML
MDA UR-MCNC: <200 NG/ML
MDEA UR-MCNC: <200 NG/ML
MDMA UR-MCNC: <200 NG/ML
METHAMPHET UR-MCNC: <200 NG/ML
PHENTERMINE UR CFM-MCNC: <200 NG/ML

## 2024-10-08 DIAGNOSIS — F98.8 ATTENTION DEFICIT DISORDER, UNSPECIFIED TYPE: ICD-10-CM

## 2024-10-08 RX ORDER — METHYLPHENIDATE HYDROCHLORIDE 54 MG/1
54 TABLET ORAL EVERY MORNING
Qty: 30 TABLET | Refills: 0 | Status: SHIPPED | OUTPATIENT
Start: 2024-10-08 | End: 2024-11-07

## 2024-10-08 RX ORDER — METHYLPHENIDATE HYDROCHLORIDE 18 MG/1
18 TABLET ORAL EVERY MORNING
Qty: 30 TABLET | Refills: 0 | Status: SHIPPED | OUTPATIENT
Start: 2024-10-08 | End: 2024-11-07

## 2024-10-08 NOTE — TELEPHONE ENCOUNTER
Rx Controlled Refill Request Telephone Encounter    Name: Erica BachGawlik  :  1975    Medication Name:     methylphenidate ER (Concerta) 18 mg extended release tablet [[id number]]    Order Details    Dose: 18 mg Route: oral Frequency: Every morning   Dispense Quantity: 30 tablet Refills: 0    Note to Pharmacy: *30 DAY SUPPLY*         Sig: Take 1 tablet (18 mg) by mouth once daily in the morning. Do not crush, chew, or split.           Medication Name:   methylphenidate ER 54 mg extended release tablet [[id number]]    Order Details    Dose: 54 mg Route: oral Frequency: Every morning   Dispense Quantity: 30 tablet Refills: 0    Note to Pharmacy: *30 DAY SUPPLY*         Sig: Take 1 tablet (54 mg) by mouth once daily in the morning. Do not crush, chew, or split.            ALLERGIES:    HAMSTER PROTEIN    LAST DRUG SCREEN:     24  LAST MED CONTRACT:        Specific Pharmacy location:    [facility]/pharmacy #[id number] [place] [id number]     Date of last appointment:    24  Date of next appointment:    24    Best number to reach patient:    [phone]

## 2024-11-12 ENCOUNTER — APPOINTMENT (OUTPATIENT)
Dept: PRIMARY CARE | Facility: CLINIC | Age: 49
End: 2024-11-12
Payer: COMMERCIAL

## 2024-11-12 ENCOUNTER — TELEPHONE (OUTPATIENT)
Dept: PRIMARY CARE | Facility: CLINIC | Age: 49
End: 2024-11-12

## 2024-11-12 VITALS
HEIGHT: 61 IN | OXYGEN SATURATION: 99 % | WEIGHT: 126 LBS | BODY MASS INDEX: 23.79 KG/M2 | TEMPERATURE: 97.7 F | HEART RATE: 78 BPM | DIASTOLIC BLOOD PRESSURE: 78 MMHG | SYSTOLIC BLOOD PRESSURE: 110 MMHG

## 2024-11-12 DIAGNOSIS — N91.2 AMENORRHEA: ICD-10-CM

## 2024-11-12 DIAGNOSIS — B07.9 VIRAL WARTS, UNSPECIFIED TYPE: ICD-10-CM

## 2024-11-12 DIAGNOSIS — R53.83 OTHER FATIGUE: ICD-10-CM

## 2024-11-12 DIAGNOSIS — G58.9 PINCHED NERVE: ICD-10-CM

## 2024-11-12 DIAGNOSIS — F98.8 ATTENTION DEFICIT DISORDER, UNSPECIFIED TYPE: Primary | ICD-10-CM

## 2024-11-12 PROCEDURE — 99213 OFFICE O/P EST LOW 20 MIN: CPT | Performed by: FAMILY MEDICINE

## 2024-11-12 PROCEDURE — 3008F BODY MASS INDEX DOCD: CPT | Performed by: FAMILY MEDICINE

## 2024-11-12 RX ORDER — METHYLPHENIDATE HYDROCHLORIDE 18 MG/1
18 TABLET ORAL EVERY MORNING
Qty: 30 TABLET | Refills: 0 | Status: SHIPPED | OUTPATIENT
Start: 2024-11-12 | End: 2024-12-12

## 2024-11-12 RX ORDER — METHYLPHENIDATE HYDROCHLORIDE 54 MG/1
54 TABLET ORAL EVERY MORNING
Qty: 30 TABLET | Refills: 0 | Status: SHIPPED | OUTPATIENT
Start: 2024-11-12 | End: 2024-12-12

## 2024-11-12 NOTE — PROGRESS NOTES
"Subjective   Patient ID: Erica Smith is a 49 y.o. female who presents for pinched nerve, ADD, Stress, and Mass.  HPI    Patient presents in office today for a pinched nerve. States that it feels like an electrical shock going into her right side in the back of right shoulder pain. Ongoing x 4 weeks long. Says it happens when she looks down, slowly getting better has not has it in two weeks think it was related to stress.    Patient presents today for a follow-up on ADD.  Currently taking methylphenidate   No negative side effects reported from this medication  Rates ADD control a 7/10.  Last took it Saturday  Controlled substance contract signed 9/11/24    Pt states she is getting older and she has not had any desire to have sexual intercourse and wonder if the concerta has anything to do with it. Pt states she has also been under a lot of stress as well    Pt states she has a bump on her RT middle finger for 3-4 months that will not go away.    No other questions or concerns at this time     Taking current medications which were reviewed.  Problem list discussed.    Overall doing well.  Eating okay.  Staying active.    Has no other new problem /question.     ROS  Constitutional- No activity change. No appetite change.  Eyes- Denies vision changes.  Respiratory- No shortness of breath.  Cardiovascular- No palpitations. No chest pain.  GI- No nausea or vomiting. No diarrhea or constipation. Denies abdominal pain.  Musculoskeletal- Denies joint swelling.  Extremities- No edema.  Neurological- Denies headaches. Denies dizziness.  Skin- No rashes.  Psychiatric/Behavioral- Denies significant anxiety, or depressed mood.     Objective     /78   Pulse 78   Temp 36.5 °C (97.7 °F) (Temporal)   Ht 1.549 m (5' 1\")   Wt 57.2 kg (126 lb)   SpO2 99%   BMI 23.81 kg/m²     Allergies   Allergen Reactions    Hamster Protein Shortness of breath     Wheezing and SOB from Hamster       Constitutional-- " Well-nourished.  No distress  Head- unremarkable.  Eyes- PERRL.  Conjunctiva normal.  Nose- Normal.  No rhinorrhea noted.  Throat- Oropharynx is clear and moist.  Neck- Supple with no thyromegaly.  No significant cervical adenopathy noted.  Pulmonary/Chest- Breath sounds normal with normal effort.  No wheezing.  Heart- Regular rate and rhythm.  No murmur.  Abdomen- Soft and non-tender.  No masses noted.  Musculoskeletal- Normal ROM.  No significant joint swelling  Extremities- No edema.   Neurological- Alert.  No noted deficits.  Skin- Warm.  No rashes.  Small wart around the lateral part of the fingernail on that finger.  This was treated with liquid nitrogen in the freeze thaw freeze method.  Tolerated well  Psychiatric/Behavioral- Mood and affect normal.  Behavior normal.     Assessment/Plan   1. Attention deficit disorder, unspecified type  methylphenidate ER (Concerta) 18 mg extended release tablet    methylphenidate ER 54 mg extended release tablet      2. Pinched nerve        3. Other fatigue  Thyroid Stimulating Hormone    Thyroxine, Free      4. Amenorrhea  FSH      5. Viral warts, unspecified type               Long talk. Treatment options reviewed.    I have reviewed and validated OARRS. I advised the patient about medication abuse, addiction, and dependency. I have counseled the patient on ADHD management.    Wart care reviewed.  Talked about treating it like a burn.    Discussed importance of natural sources of nutrition.  Advised patient to consume vegetables, salads, fruits, nuts, and proteins such as fish and chicken.  Discussed portion control.      Discussed the importance of routine stretching and exercise.     Complete blood work as discussed. Advised patient to remain properly hydrated.     Continue and take your medications as prescribed.    Health Maintenance issues discussed.    Importance of healthy diet and regular exercise regimen discussed.    We will contact you with any test results  ordered. If you do not hear from us, please contact.    Follow-up as instructed or sooner if any problems or symptoms do not resolve as expected.          Scribe Attestation  By signing my name below, ITari Scribe   attest that this documentation has been prepared under the direction and in the presence of Jesus Gary MD.

## 2024-11-12 NOTE — LETTER
November 12, 2024     Patient: Erica Smith   YOB: 1975   Date of Visit: 11/12/2024       To Whom It May Concern:    Erica Smith was seen in my clinic on 11/12/2024. Please excuse Erica for her absence from work on this day to make the appointment.    If you have any questions or concerns, please don't hesitate to call.         Sincerely,         Jesus Gary MD        CC: No Recipients

## 2024-11-22 ENCOUNTER — LAB (OUTPATIENT)
Dept: LAB | Facility: LAB | Age: 49
End: 2024-11-22
Payer: COMMERCIAL

## 2024-11-22 DIAGNOSIS — N91.2 AMENORRHEA: ICD-10-CM

## 2024-11-22 DIAGNOSIS — R53.83 OTHER FATIGUE: ICD-10-CM

## 2024-11-22 LAB
T4 FREE SERPL-MCNC: 0.81 NG/DL (ref 0.61–1.12)
TSH SERPL-ACNC: 1.86 MIU/L (ref 0.44–3.98)

## 2024-11-22 PROCEDURE — 36415 COLL VENOUS BLD VENIPUNCTURE: CPT

## 2024-11-22 PROCEDURE — 84439 ASSAY OF FREE THYROXINE: CPT

## 2024-11-22 PROCEDURE — 83001 ASSAY OF GONADOTROPIN (FSH): CPT

## 2024-11-22 PROCEDURE — 84443 ASSAY THYROID STIM HORMONE: CPT

## 2024-11-23 LAB — FSH SERPL-ACNC: 107.4 IU/L

## 2024-11-25 ENCOUNTER — TELEPHONE (OUTPATIENT)
Dept: PRIMARY CARE | Facility: CLINIC | Age: 49
End: 2024-11-25
Payer: COMMERCIAL

## 2024-11-26 ENCOUNTER — TELEPHONE (OUTPATIENT)
Dept: PRIMARY CARE | Facility: CLINIC | Age: 49
End: 2024-11-26
Payer: COMMERCIAL

## 2024-11-26 NOTE — TELEPHONE ENCOUNTER
----- Message from Jesus Gary sent at 11/26/2024  7:31 AM EST -----  Please let her know that her thyroid levels are in the normal range and her FSH level indicates she is in menopause.  Continue healthy diet and lifestyle.  Call or follow-up with any lingering issues.  Please let her know.  Thanks

## 2024-12-12 DIAGNOSIS — F98.8 ATTENTION DEFICIT DISORDER, UNSPECIFIED TYPE: ICD-10-CM

## 2024-12-12 RX ORDER — METHYLPHENIDATE HYDROCHLORIDE 54 MG/1
54 TABLET ORAL EVERY MORNING
Qty: 30 TABLET | Refills: 0 | Status: SHIPPED | OUTPATIENT
Start: 2024-12-12

## 2024-12-12 RX ORDER — METHYLPHENIDATE HYDROCHLORIDE 18 MG/1
18 TABLET ORAL EVERY MORNING
Qty: 30 TABLET | Refills: 0 | Status: SHIPPED | OUTPATIENT
Start: 2024-12-12 | End: 2025-01-11

## 2024-12-12 NOTE — TELEPHONE ENCOUNTER
Rx Refill Request Telephone Encounter    Name:  Erica BachGaswetha  :  [id number]  methylphenidate ER (Concerta) 18 mg extended release tablet [[id number]]    Order Details  Dose: 18 mg Route: oral Frequency: Every morning   Dispense Quantity: 30 tablet Refills: 0    Note to Pharmacy: *30 DAY SUPPLY*         Sig: Take 1 tablet (18 mg) by mouth once daily in the morning. Do not crush, chew, or split.   methylphenidate ER 54 mg extended release tablet [[id number]]    Order Details  Dose: 54 mg Route: oral Frequency: Every morning   Dispense Quantity: 30 tablet Refills: 0    Note to Pharmacy: *30 DAY SUPPLY*         Sig: Take 1 tablet (54 mg) by mouth once daily in the morning. Do not crush, chew, or split.           Specific Pharmacy location:    [facility]/pharmacy #[address] [place] [address]  Phone: [phone]  Fax: [phone]  PAPITO #: [id number]     Date of last appointment:  24  Date of next appointment:  na  Best number to reach patient:  [phone]

## 2025-01-15 DIAGNOSIS — F98.8 ATTENTION DEFICIT DISORDER, UNSPECIFIED TYPE: ICD-10-CM

## 2025-01-15 RX ORDER — METHYLPHENIDATE HYDROCHLORIDE 54 MG/1
54 TABLET ORAL EVERY MORNING
Qty: 30 TABLET | Refills: 0 | Status: SHIPPED | OUTPATIENT
Start: 2025-01-15 | End: 2025-02-14

## 2025-01-15 RX ORDER — METHYLPHENIDATE HYDROCHLORIDE 18 MG/1
18 TABLET ORAL EVERY MORNING
Qty: 30 TABLET | Refills: 0 | Status: SHIPPED | OUTPATIENT
Start: 2025-01-15 | End: 2025-02-14

## 2025-01-15 NOTE — TELEPHONE ENCOUNTER
Rx Refill Request Telephone Encounter  [facility] [address] 24  Name:  Erica BcahGawarlene  :  [id number]  methylphenidate ER (Concerta) 18 mg extended release tablet [[id number]]      Order Details  Dose: 18 mg Route: oral Frequency: Every morning   Dispense Quantity: 30 tablet Refills: 0    Note to Pharmacy: *30 DAY SUPPLY*         Sig: Take 1 tablet (18 mg) by mouth once daily in the morning. Do not crush, chew, or split.         methylphenidate ER 54 mg extended release tablet [[id number]]    Order Details  Dose: 54 mg Route: oral Frequency: Every morning   Dispense Quantity: 30 tablet Refills: 0    Note to Pharmacy: * 30 day supply *         Sig: Take 1 tablet (54 mg) by mouth once daily in the morning. Do not crush, chew, or split.     Specific Pharmacy location:    [facility]/pharmacy #[id number] - [place] [id number]  Phone: [phone]  Fax: [phone]  PAPITO #: [id number]     Date of last appointment:  24  Date of next appointment:  NA  Best number to reach patient:  [phone]

## 2025-02-14 DIAGNOSIS — F98.8 ATTENTION DEFICIT DISORDER, UNSPECIFIED TYPE: ICD-10-CM

## 2025-02-14 RX ORDER — METHYLPHENIDATE HYDROCHLORIDE 18 MG/1
18 TABLET ORAL EVERY MORNING
Qty: 30 TABLET | Refills: 0 | Status: SHIPPED | OUTPATIENT
Start: 2025-02-14 | End: 2025-03-16

## 2025-02-14 RX ORDER — METHYLPHENIDATE HYDROCHLORIDE 54 MG/1
54 TABLET ORAL EVERY MORNING
Qty: 30 TABLET | Refills: 0 | Status: SHIPPED | OUTPATIENT
Start: 2025-02-14

## 2025-02-14 NOTE — TELEPHONE ENCOUNTER
MEDICATION PENDED  Rx Refill Request Telephone Encounter    [address] 24  Name:  Erica BachGaswetha  :  [id number]  methylphenidate ER (Concerta) 18 mg extended release tablet [[id number]]    Order Details    Dose: 18 mg Route: oral Frequency: Every morning   Dispense Quantity: 30 tablet Refills: 0    Note to Pharmacy: *30 DAY SUPPLY*         Sig: Take 1 tablet (18 mg) by mouth once daily in the morning. Do not crush, chew, or split.         methylphenidate ER 54 mg extended release tablet [[id number]]    Order Details    Dose: 54 mg Route: oral Frequency: Every morning   Dispense Quantity: 30 tablet Refills: 0    Note to Pharmacy: *30 DAY SUPPLY*         Sig: Take 1 tablet (54 mg) by mouth once daily in the morning. Do not crush, chew, or split.           Specific Pharmacy location:    [facility]/pharmacy #[id number] - [place] [id number]  Phone: [phone]  Fax: [phone]  PAPITO #: [id number]     Date of last appointment:  24  Date of next appointment:  na  Best number to reach patient:  [phone]

## 2025-03-19 DIAGNOSIS — F98.8 ATTENTION DEFICIT DISORDER, UNSPECIFIED TYPE: ICD-10-CM

## 2025-03-19 RX ORDER — METHYLPHENIDATE HYDROCHLORIDE 54 MG/1
54 TABLET ORAL EVERY MORNING
Qty: 30 TABLET | Refills: 0 | Status: SHIPPED | OUTPATIENT
Start: 2025-03-19

## 2025-03-19 RX ORDER — METHYLPHENIDATE HYDROCHLORIDE 18 MG/1
18 TABLET ORAL EVERY MORNING
Qty: 30 TABLET | Refills: 0 | Status: SHIPPED | OUTPATIENT
Start: 2025-03-19 | End: 2025-04-18

## 2025-03-19 NOTE — TELEPHONE ENCOUNTER
MEDICATION PENDED  Rx Controlled Refill Request Telephone Encounter    Name: Erica Kincaid-Gawlik  :  1975    Medication Name:   CONCERTA ER  Dose (Optional):   18 MG  Quantity (Optional):   30  Directions (Optional):   1 TABLET DAILY    Medication Name:   CONCERTA ER  Dose (Optional):   54 MG  Quantity (Optional):   30  Directions (Optional):   1 TABLET DAILY      ALLERGIES:    ON FILE    LAST DRUG SCREEN/MED CONTRACT:     24    Specific Pharmacy location:    [facility]    Date of last appointment:    24  Date of next appointment:    NONE    Best number to reach patient:    [phone]

## 2025-03-21 ENCOUNTER — APPOINTMENT (OUTPATIENT)
Dept: PRIMARY CARE | Facility: CLINIC | Age: 50
End: 2025-03-21
Payer: COMMERCIAL

## 2025-03-21 ENCOUNTER — TELEPHONE (OUTPATIENT)
Dept: PRIMARY CARE | Facility: CLINIC | Age: 50
End: 2025-03-21

## 2025-03-21 VITALS
WEIGHT: 127 LBS | RESPIRATION RATE: 18 BRPM | DIASTOLIC BLOOD PRESSURE: 70 MMHG | BODY MASS INDEX: 23.98 KG/M2 | HEIGHT: 61 IN | SYSTOLIC BLOOD PRESSURE: 110 MMHG

## 2025-03-21 DIAGNOSIS — F98.8 ATTENTION DEFICIT DISORDER, UNSPECIFIED TYPE: ICD-10-CM

## 2025-03-21 DIAGNOSIS — R53.83 FATIGUE, UNSPECIFIED TYPE: ICD-10-CM

## 2025-03-21 DIAGNOSIS — Z23 NEED FOR SHINGLES VACCINE: ICD-10-CM

## 2025-03-21 DIAGNOSIS — Z00.00 ROUTINE GENERAL MEDICAL EXAMINATION AT A HEALTH CARE FACILITY: Primary | ICD-10-CM

## 2025-03-21 DIAGNOSIS — E78.5 DYSLIPIDEMIA: ICD-10-CM

## 2025-03-21 PROCEDURE — 90750 HZV VACC RECOMBINANT IM: CPT | Performed by: FAMILY MEDICINE

## 2025-03-21 PROCEDURE — 90471 IMMUNIZATION ADMIN: CPT | Performed by: FAMILY MEDICINE

## 2025-03-21 PROCEDURE — 3008F BODY MASS INDEX DOCD: CPT | Performed by: FAMILY MEDICINE

## 2025-03-21 PROCEDURE — 99396 PREV VISIT EST AGE 40-64: CPT | Performed by: FAMILY MEDICINE

## 2025-03-21 NOTE — LETTER
March 21, 2025     Patient: Erica Smith   YOB: 1975   Date of Visit: 3/21/2025       To Whom It May Concern:    Erica Smith was seen in my office 03/21/2025. Please excuse patient from work 03/21/2025 and she may return on 03/24/2025.    If you have any questions or concerns, please don't hesitate to call.         Sincerely,         Jesus Gary MD

## 2025-03-21 NOTE — PROGRESS NOTES
"Subjective   Patient ID: Erica Smith is a 49 y.o. female who presents for ADD.  HPI    Patient presents today for a wellness visit and follow-up on ADD.  Currently taking Concerta  No negative side effects reported from this medication  Rates ADD control a 6-7/10.  Last took it this morning   Controlled substance contract signed 9/11/24    Would like to get the second shingles vaccine.     No other questions or concerns at this time     Taking current medications which were reviewed.  Problem list discussed.    Overall doing well.  Eating okay.  Staying active.    Has no other new problem /question.     ROS  Constitutional- No activity change. No appetite change.  Eyes- Denies vision changes.  Respiratory- No shortness of breath.  Cardiovascular- No palpitations. No chest pain.  GI- No nausea or vomiting. No diarrhea or constipation. Denies abdominal pain.  Musculoskeletal- Denies joint swelling.  Extremities- No edema.  Neurological- Denies headaches. Denies dizziness.  Skin- No rashes.  Psychiatric/Behavioral- Denies significant anxiety, or depressed mood.     Objective     /70   Resp 18   Ht 1.549 m (5' 1\")   Wt 57.6 kg (127 lb)   BMI 24.00 kg/m²     Allergies   Allergen Reactions    Hamster Protein Shortness of breath     Wheezing and SOB from Hamster       Constitutional-- Well-nourished.  No distress  Head- unremarkable.  Eyes- PERRL.  Conjunctiva normal.  Nose- Normal.  No rhinorrhea noted.  Throat- Oropharynx is clear and moist.  Neck- Supple with no thyromegaly.  No significant cervical adenopathy noted.  Pulmonary/Chest- Breath sounds normal with normal effort.  No wheezing.  Heart- Regular rate and rhythm.  No murmur.  Abdomen- Soft and non-tender.  No masses noted.  Musculoskeletal- Normal ROM.  No significant joint swelling  Extremities- No edema.   Neurological- Alert.  No noted deficits.  Skin- Warm.  No rashes.  Psychiatric/Behavioral- Mood and affect normal.  Behavior normal. "     Assessment/Plan   1. Routine general medical examination at a health care facility        2. Attention deficit disorder, unspecified type        3. Need for shingles vaccine  Zoster vaccine, recombinant, adult (SHINGRIX)      4. Dyslipidemia  Lipid Panel    Lipid Panel      5. Fatigue, unspecified type  CBC and Auto Differential    Comprehensive Metabolic Panel    CBC and Auto Differential    Comprehensive Metabolic Panel             Long talk. Treatment options reviewed.    Reviewed most recent lab work with patient. Advised patient to remain up to date on routine maintenance and health screening.      I have reviewed and validated OARRS.  I have counseled the patient on ADHD management.  Understands to use least amount of medication to control her symptoms    Discussed importance of natural sources of nutrition.  Advised patient to consume vegetables, salads, fruits, nuts, and proteins such as fish and chicken.  Discussed portion control.      Discussed the importance of routine stretching and exercise.     Complete blood work as discussed. Advised patient to remain properly hydrated.     Continue and take your medications as prescribed.    Health Maintenance issues discussed.    Importance of healthy diet and regular exercise regimen discussed.    We will contact you with any test results ordered. If you do not hear from us, please contact.    Follow-up as instructed or sooner if any problems or symptoms do not resolve as expected.      Scribe Attestation  By signing my name below, ITari Scribe   attest that this documentation has been prepared under the direction and in the presence of Jesus Gary MD.

## 2025-04-22 DIAGNOSIS — F98.8 ATTENTION DEFICIT DISORDER, UNSPECIFIED TYPE: ICD-10-CM

## 2025-04-22 RX ORDER — METHYLPHENIDATE HYDROCHLORIDE 54 MG/1
54 TABLET ORAL EVERY MORNING
Qty: 30 TABLET | Refills: 0 | Status: SHIPPED | OUTPATIENT
Start: 2025-04-22 | End: 2025-05-22

## 2025-04-22 RX ORDER — METHYLPHENIDATE HYDROCHLORIDE 18 MG/1
18 TABLET ORAL EVERY MORNING
Qty: 30 TABLET | Refills: 0 | Status: SHIPPED | OUTPATIENT
Start: 2025-04-22 | End: 2025-05-22

## 2025-04-22 NOTE — TELEPHONE ENCOUNTER
MEDICATION PENDED  Rx Controlled Refill Request Telephone Encounter    Name: Erica BachGawlik  :  1975    methylphenidate ER (Concerta) 18 mg extended release tablet [[id number]]      Order Details  Dose: 18 mg Route: oral Frequency: Every morning   Dispense Quantity: 30 tablet (30 day supply) Refills: 0    Duration: 30 days Dispense As Written: No    Note to Pharmacy: *30 DAY SUPPLY*         Sig: Take 1 tablet (18 mg) by mouth once daily in the morning. Do not crush, chew, or split.           methylphenidate ER 54 mg extended release tablet [[id number]]    Order Details    Dose: 54 mg Route: oral Frequency: Every morning   Dispense Quantity: 30 tablet (30 day supply) Refills: 0    Duration: -- Dispense As Written: No    Note to Pharmacy: * 30 day supply *         Sig: Take 1 tablet (54 mg) by mouth once daily in the morning. Do not crush, chew, or split.           ALLERGIES:    see list     LAST DRUG SCREEN/MED CONTRACT:     24    Specific Pharmacy location:    [facility]/pharmacy #[id number] - [place] [id number]  Phone: [phone]  Fax: [phone]  PAPITO #: [id number]     Date of last appointment:    3/21/25  Date of next appointment:    na    Best number to reach patient:    [phone]

## 2025-05-22 DIAGNOSIS — F98.8 ATTENTION DEFICIT DISORDER, UNSPECIFIED TYPE: ICD-10-CM

## 2025-05-22 RX ORDER — METHYLPHENIDATE HYDROCHLORIDE 54 MG/1
54 TABLET ORAL EVERY MORNING
Qty: 30 TABLET | Refills: 0 | Status: SHIPPED | OUTPATIENT
Start: 2025-05-22

## 2025-05-22 RX ORDER — METHYLPHENIDATE HYDROCHLORIDE 18 MG/1
18 TABLET ORAL EVERY MORNING
Qty: 30 TABLET | Refills: 0 | Status: SHIPPED | OUTPATIENT
Start: 2025-05-22 | End: 2025-06-21

## 2025-05-22 NOTE — TELEPHONE ENCOUNTER
Rx Controlled Refill Request Telephone Encounter    Name: Erica BachGawlik  :  1975    methylphenidate ER (Concerta) 18 mg extended release tablet [[id number]]    Order Details  Dose: 18 mg Route: oral Frequency: Every morning   Dispense Quantity: 30 tablet (30 day supply) Refills: 0    Duration: 30 days Dispense As Written: No    Note to Pharmacy: *30 DAY SUPPLY*         Sig: Take 1 tablet (18 mg) by mouth once daily in the morning. Do not crush, chew, or split.     methylphenidate ER 54 mg extended release tablet [[id number]]    Order Details  Dose: 54 mg Route: oral Frequency: Every morning   Dispense Quantity: 30 tablet (30 day supply) Refills: 0    Duration: 30 days Dispense As Written: No    Note to Pharmacy: *30 DAY SUPPLY*         Sig: Take 1 tablet (54 mg) by mouth once daily in the morning. Do not crush, chew, or split.     ALLERGIES:    SEE LIST    LAST DRUG SCREEN/MED CONTRACT:     2024    Specific Pharmacy location:    [facility]    Date of last appointment:    2025  Date of next appointment:    NONE    Best number to reach patient:    [phone]

## 2025-06-19 ENCOUNTER — TELEPHONE (OUTPATIENT)
Dept: PRIMARY CARE | Facility: CLINIC | Age: 50
End: 2025-06-19
Payer: COMMERCIAL

## 2025-06-19 DIAGNOSIS — N30.20: Primary | ICD-10-CM

## 2025-06-19 NOTE — TELEPHONE ENCOUNTER
UTI after Intervcourse  (Newest Message First)             Erica OLIVAS [id number] [place] [address] Clinical Support Staff (supporting Jesus Gary MD) (Selected Message)        6/19/25  1:26 PM  I was speaking to a nurse about each time I have inter course it’s followed by. A UTI she suffers with the same thing and said her Dr prescribes her Bactrim to take prior to intercourse or after. I am hoping I could ask if you could call in a Rx. For me I am currently on vacation and I could  the Rx from [facility] in [facility] on [address]  thank you

## 2025-06-20 RX ORDER — SULFAMETHOXAZOLE AND TRIMETHOPRIM 400; 80 MG/1; MG/1
TABLET ORAL
Qty: 20 TABLET | Refills: 0 | Status: SHIPPED | OUTPATIENT
Start: 2025-06-20

## 2025-06-26 DIAGNOSIS — F98.8 ATTENTION DEFICIT DISORDER, UNSPECIFIED TYPE: ICD-10-CM

## 2025-06-26 RX ORDER — METHYLPHENIDATE HYDROCHLORIDE 54 MG/1
54 TABLET ORAL EVERY MORNING
Qty: 30 TABLET | Refills: 0 | Status: SHIPPED | OUTPATIENT
Start: 2025-06-26 | End: 2025-07-26

## 2025-06-26 RX ORDER — METHYLPHENIDATE HYDROCHLORIDE 18 MG/1
18 TABLET ORAL EVERY MORNING
Qty: 30 TABLET | Refills: 0 | Status: SHIPPED | OUTPATIENT
Start: 2025-06-26 | End: 2025-07-26

## 2025-06-26 NOTE — TELEPHONE ENCOUNTER
Rx Controlled Refill Request Telephone Encounter    Name: Erica Kincaid-Gawlik  :  1975    Medication Name:   METHYLPHENIDATE ER  Dose (Optional):   54 MG  Quantity (Optional):   30  Directions (Optional):   1 TABLET DAILY    Medication Name:   METHYLPHENIDATE ER  Dose (Optional):   18 MG  Quantity (Optional):   30  Directions (Optional):   1 TABLET DAILY    ALLERGIES:    ON FILE    LAST DRUG SCREEN/MED CONTRACT:     24    Specific Pharmacy location:    [facility] [place]    Date of last appointment:    25  Date of next appointment:    NONE    Best number to reach patient:    [phone]

## 2025-07-24 DIAGNOSIS — F98.8 ATTENTION DEFICIT DISORDER, UNSPECIFIED TYPE: ICD-10-CM

## 2025-07-24 NOTE — TELEPHONE ENCOUNTER
Rx Controlled Refill Request Telephone Encounter     Name: Erica Kincaid-Gawlik  :  1975     Medication Name:   METHYLPHENIDATE ER  Dose (Optional):   54 MG  Quantity (Optional):   30  Directions (Optional):   1 TABLET DAILY     Medication Name:   METHYLPHENIDATE ER  Dose (Optional):   18 MG  Quantity (Optional):   30  Directions (Optional):   1 TABLET DAILY     ALLERGIES:    ON FILE     LAST DRUG SCREEN/MED CONTRACT:     24     Specific Pharmacy location:    [facility] IN [place]     Date of last appointment:    25  Date of next appointment:    NONE     Best number to reach patient:    [phone]

## 2025-07-25 RX ORDER — METHYLPHENIDATE HYDROCHLORIDE 18 MG/1
18 TABLET ORAL EVERY MORNING
Qty: 30 TABLET | Refills: 0 | Status: SHIPPED | OUTPATIENT
Start: 2025-07-25 | End: 2025-08-24

## 2025-07-25 RX ORDER — METHYLPHENIDATE HYDROCHLORIDE 54 MG/1
54 TABLET ORAL EVERY MORNING
Qty: 30 TABLET | Refills: 0 | Status: SHIPPED | OUTPATIENT
Start: 2025-07-25 | End: 2025-08-24

## 2025-08-25 DIAGNOSIS — F98.8 ATTENTION DEFICIT DISORDER, UNSPECIFIED TYPE: ICD-10-CM

## 2025-08-25 RX ORDER — METHYLPHENIDATE HYDROCHLORIDE 54 MG/1
54 TABLET ORAL EVERY MORNING
Qty: 30 TABLET | Refills: 0 | Status: SHIPPED | OUTPATIENT
Start: 2025-08-25 | End: 2025-09-24

## 2025-08-25 RX ORDER — METHYLPHENIDATE HYDROCHLORIDE 18 MG/1
18 TABLET ORAL EVERY MORNING
Qty: 30 TABLET | Refills: 0 | Status: SHIPPED | OUTPATIENT
Start: 2025-08-25 | End: 2025-09-24